# Patient Record
Sex: MALE | Race: WHITE | NOT HISPANIC OR LATINO | Employment: FULL TIME
[De-identification: names, ages, dates, MRNs, and addresses within clinical notes are randomized per-mention and may not be internally consistent; named-entity substitution may affect disease eponyms.]

---

## 2023-02-16 ENCOUNTER — TRANSCRIBE ORDERS (OUTPATIENT)
Dept: OTHER | Age: 38
End: 2023-02-16

## 2023-02-16 DIAGNOSIS — I47.20 VT (VENTRICULAR TACHYCARDIA) (H): Primary | ICD-10-CM

## 2023-02-20 NOTE — TELEPHONE ENCOUNTER
RECORDS RECEIVED FROM: internal /ce    DATE RECEIVED: 2.24.23    NOTES STATUS DETAILS   OFFICE NOTE from referring provider    internal  Jose Daniel Pacheco MD   OFFICE NOTE from other cardiologist        SUMMARY from hospital/ED       MEDICATION LIST   internal     DIAGNOSTIC PROCEDURES     EKG   [facility]- 1.1.23, 9.6.22, 9.5.22, 9.3.22,    Monitor Reports   internal /ce  Internal -(Standing) 2.16.23 Cardiac Device     [place]-- Pacart device- 2.9.23, 2.2.23, 1.20.23, 1.3.23, 10.12.22, 9.22.22,   Pacer- 9.13.22   IMAGING (DISC & REPORT)      Echo   [facility]- 2.3.23, 9.12.22, 9.4.22,        Action 2.20.23 sv    Action Taken Records request sent to  --EKG- 1.1.23, 9.6.22, 9.5.22, 9.3.22,   -- Pacart device- 2.9.23, 2.2.23, 1.20.23, 1.3.23, 10.12.22, 9.22.22,   Pacer- 9.13.22  --ECHO--2.3.23, 9.12.22, 9.4.22,

## 2023-02-24 ENCOUNTER — PRE VISIT (OUTPATIENT)
Dept: CARDIOLOGY | Facility: CLINIC | Age: 38
End: 2023-02-24
Payer: COMMERCIAL

## 2023-02-24 ENCOUNTER — VIRTUAL VISIT (OUTPATIENT)
Dept: CARDIOLOGY | Facility: CLINIC | Age: 38
End: 2023-02-24
Attending: INTERNAL MEDICINE
Payer: COMMERCIAL

## 2023-02-24 DIAGNOSIS — I47.20 VT (VENTRICULAR TACHYCARDIA) (H): ICD-10-CM

## 2023-02-24 PROCEDURE — G0463 HOSPITAL OUTPT CLINIC VISIT: HCPCS | Mod: GT,PN | Performed by: INTERNAL MEDICINE

## 2023-02-24 PROCEDURE — 99205 OFFICE O/P NEW HI 60 MIN: CPT | Mod: GT | Performed by: INTERNAL MEDICINE

## 2023-02-24 RX ORDER — BETAMETHASONE DIPROPIONATE 0.5 MG/G
CREAM TOPICAL
COMMUNITY
Start: 2022-10-09

## 2023-02-24 RX ORDER — LORAZEPAM 0.5 MG/1
1 TABLET ORAL DAILY PRN
Status: ON HOLD | COMMUNITY
Start: 2023-02-18 | End: 2023-04-25

## 2023-02-24 RX ORDER — LIDOCAINE 40 MG/G
CREAM TOPICAL
Status: CANCELLED | OUTPATIENT
Start: 2023-02-24

## 2023-02-24 RX ORDER — METOPROLOL TARTRATE 100 MG
1 TABLET ORAL
COMMUNITY
Start: 2023-01-24

## 2023-02-24 RX ORDER — AMIODARONE HYDROCHLORIDE 200 MG/1
400 TABLET ORAL EVERY MORNING
Status: ON HOLD | COMMUNITY
Start: 2022-09-23 | End: 2023-04-25

## 2023-02-24 RX ORDER — SODIUM CHLORIDE 9 MG/ML
INJECTION, SOLUTION INTRAVENOUS CONTINUOUS
Status: CANCELLED | OUTPATIENT
Start: 2023-02-24

## 2023-02-24 RX ORDER — FOLIC ACID 1 MG/1
1 TABLET ORAL EVERY EVENING
COMMUNITY
Start: 2022-12-31

## 2023-02-24 NOTE — LETTER
2/24/2023      RE: Ryan Morfin  [address] [place] [address]       Dear Colleague,    Thank you for the opportunity to participate in the care of your patient, Ryan Morfin, at the [facility] [place] [facility]. Please see a copy of my visit note below.      Video-Visit Details    Type of service:  Video Visit    Video Start Time (time video started): 9:00 AM    Video End Time (time video stopped): 9:30 AM    Originating Location (pt. Location): Home        Distant Location (provider location):  On-site    Mode of Communication:  Video Conference via [facility]        ELECTROPHYSIOLOGY CLINIC VISIT    Assessment/Recommendations   Assessment/Plan:    Mr Morfin is a 38 yo male patient with PMHx significant for out of hospital cardiac arrest due to VT with no clear etiology otherwise, s/p ICD 9/2022. He is here for VT management.    The patient has had recurrences of mostly monomorphic VT that is fast around 210 bpm.  His work-up for the etiology has been negative.  He has had a negative cardiac angiogram and a cardiac MRI showing no LGE, with only some LV dysfunction on the MRI.  An echocardiogram done afterwards shows a normal ejection fraction.  We will ask for the MRI images to review in our center to make sure if we need to repeat CMR.  We discussed the options for his arrhythmias.  He is young and we would like not to use amiodarone long-term for his arrhythmias.  The alternative would be switching to sotalol.  The other option would be to try an EP study and VT ablation.  The risks of the procedure were explained to the patient.  They include vascular complications, bleeding, pericardial effusion tamponade, CVA or TIA, heart failure.  The success of the procedure is variable depending on inducibility and the situation, and averages about 70%.  The patient prefers trying an ablation over long-term medication.  We will schedule the VT  ablation in the near future and aim to stop amiodarone about a week before that.  We might require a cardiac MRI for the procedure depending on the images from [place].         History of Present Illness/Subjective    Mr. Ryan Morfin is a 37 year old male who comes in today for EP consultation for Vt management..    Mr Morfin is a 36 yo male patient with PMHx significant for out of hospital cardiac arrest due to VT with no clear etiology otherwise, s/p ICD 9/2022. He is here for VT management.    He has history of out of hospital cardiac arrest last fall 9/4/2022. He was at home sitting and watching TV and he had a witnessed cardiac arrest. He had CPR and required defibrillation. He was transferred to [place]. He had a LHC done that showed no significant CAD. ECHO showed EF 55-60%, no wall motion abnormalities. He did undergo cardiac MRI that showed LV EF 41%. During his hospitalization he had further monomorphic VT re quiring 2 shocks prior to ROSC. Telemetry showed monomorphic VT. He did undergo single chamber ICD implant for secondary prevention and was placed on amiodarone.     On December 31st he had an apprpriate ICD shock for monomorphic VT. This was successful. In January he did go to the [place] ED with symptoms of chest pain and chest tightness. He was treated for anxiety. ICD programmed for one VF zone at 200 bpm.    On February 2nd, he was traveling in the vehicle when he started to have symptoms of chest tightness and palpitations, he reports feeling like he was going to have a panic attack. he then received ICD shock. They went to the ED in [place]. He received another appropriate shock for monomorphic VT. Thus his 2 shocks on February 2nd were appropriate. He was kept in [place] and reports that he was loaded with IV amiodarone. His potassium level was 3.6 so he was given IV potassium and IV magnesium. ECHO done showed normal LV function. He has not had any further shocks. He does have at  times chest tightness. He denies lightheaded, dizziness. He has not had genetic testing. He has been on amiodarone 400 every day since September 2022, and had ICD shocks while on 400 daily.  He has no family Hx of VT, father HF but not sure if had MIs, passed away from COVID 2020, estranged with father and dos not know a lot about his family. Maternal GF had MI, passed away long time ago, unsure of reason    I have reviewed and updated the patient's Past Medical History, Social History, Family History and Medication List.     Cardiographics (Personally Reviewed) :   Echo 9/4/2022:      Left Ventricle: The left ventricle appears normal in size. Wall thickness is normal. Normal left ventricular systolic function. The EF is visually estimated to be 55-60%. There are no wall motion abnormalities.      Right Ventricle: The right ventricle appears normal in size.      Left Atrium: The left atrium is normal in size.      Pulmonic Artery: There is no pulmonary hypertension.     Coronary Angiogram 9/6/2022:  Conclusions:   1. No significant CAD.   2. Elevated LV EDP.     CMR 9/8/2022:  1.The left ventricle and right ventricle are normal in dimensions. Left atria and right atria are normal in dimensions. There is no LVH.   2.The left ventricular systolic function is mildly reduced (LV EF is 44%). Mild global hypokinesis is noted. The right ventricular systolic function is normal (RV EF is 45 %).   3.There are no significant valvular abnormalities.   4. Late gadolinium enhanced images are normal. There is no evidence of fibrosis or scar.   5.The pericardium is normal and free of effusions.                  Physical Examination   There were no vitals taken for this visit.  Wt Readings from Last 3 Encounters:   No data found for Wt            Medications  Allergies   No current outpatient medications on file.    Not on File      Lab Results (Personally Reviewed)    Chemistry/lipid CBC Cardiac Enzymes/BNP/TSH/INR   No results  found for: CREATININE, BUN, NA, CO2  No results found for: CR    No results found for: CHOL, HDL, LDL, CHOLHDL   No results found for: WBC, HGB, HCT, MCV, PLT No results found for: CKTOTAL, CKMB, TROPONINI, BNP, TSH, INR       Total time spent on patient visit, reviewing notes, imaging, labs, orders, and completing necessary documentation: 60 minutes.               Please do not hesitate to contact me if you have any questions/concerns.     Sincerely,     Jose Daniel Pacheco MD

## 2023-02-24 NOTE — PROGRESS NOTES
Video-Visit Details    Type of service:  Video Visit    Video Start Time (time video started): 9:00 AM    Video End Time (time video stopped): 9:30 AM    Originating Location (pt. Location): Home        Distant Location (provider location):  On-site    Mode of Communication:  Video Conference via [facility]        ELECTROPHYSIOLOGY CLINIC VISIT    Assessment/Recommendations   Assessment/Plan:    Mr Morfin is a 38 yo male patient with PMHx significant for out of hospital cardiac arrest due to VT with no clear etiology otherwise, s/p ICD 9/2022. He is here for VT management.    The patient has had recurrences of mostly monomorphic VT that is fast around 210 bpm.  His work-up for the etiology has been negative.  He has had a negative cardiac angiogram and a cardiac MRI showing no LGE, with only some LV dysfunction on the MRI.  An echocardiogram done afterwards shows a normal ejection fraction.  We will ask for the MRI images to review in our center to make sure if we need to repeat CMR.  We discussed the options for his arrhythmias.  He is young and we would like not to use amiodarone long-term for his arrhythmias.  The alternative would be switching to sotalol.  The other option would be to try an EP study and VT ablation.  The risks of the procedure were explained to the patient.  They include vascular complications, bleeding, pericardial effusion tamponade, CVA or TIA, heart failure.  The success of the procedure is variable depending on inducibility and the situation, and averages about 70%.  The patient prefers trying an ablation over long-term medication.  We will schedule the VT ablation in the near future and aim to stop amiodarone about a week before that.  We might require a cardiac MRI for the procedure depending on the images from [place].         History of Present Illness/Subjective    Mr. Ryan Morfin is a 37 year old male who comes in today for EP consultation for Vt management..      Navjot is a 36 yo male patient with PMHx significant for out of hospital cardiac arrest due to VT with no clear etiology otherwise, s/p ICD 9/2022. He is here for VT management.    He has history of out of hospital cardiac arrest last fall 9/4/2022. He was at home sitting and watching TV and he had a witnessed cardiac arrest. He had CPR and required defibrillation. He was transferred to [place]. He had a LHC done that showed no significant CAD. ECHO showed EF 55-60%, no wall motion abnormalities. He did undergo cardiac MRI that showed LV EF 41%. During his hospitalization he had further monomorphic VT re quiring 2 shocks prior to ROSC. Telemetry showed monomorphic VT. He did undergo single chamber ICD implant for secondary prevention and was placed on amiodarone.     On December 31st he had an apprpriate ICD shock for monomorphic VT. This was successful. In January he did go to the [place] ED with symptoms of chest pain and chest tightness. He was treated for anxiety. ICD programmed for one VF zone at 200 bpm.    On February 2nd, he was traveling in the vehicle when he started to have symptoms of chest tightness and palpitations, he reports feeling like he was going to have a panic attack. he then received ICD shock. They went to the ED in [place]. He received another appropriate shock for monomorphic VT. Thus his 2 shocks on February 2nd were appropriate. He was kept in [place] and reports that he was loaded with IV amiodarone. His potassium level was 3.6 so he was given IV potassium and IV magnesium. ECHO done showed normal LV function. He has not had any further shocks. He does have at times chest tightness. He denies lightheaded, dizziness. He has not had genetic testing. He has been on amiodarone 400 every day since September 2022, and had ICD shocks while on 400 daily.  He has no family Hx of VT, father HF but not sure if had MIs, passed away from COVID 2020, estranged with father and dos not know a lot about  his family. Maternal GF had MI, passed away long time ago, unsure of reason    I have reviewed and updated the patient's Past Medical History, Social History, Family History and Medication List.     Cardiographics (Personally Reviewed) :   Echo 9/4/2022:      Left Ventricle: The left ventricle appears normal in size. Wall thickness is normal. Normal left ventricular systolic function. The EF is visually estimated to be 55-60%. There are no wall motion abnormalities.      Right Ventricle: The right ventricle appears normal in size.      Left Atrium: The left atrium is normal in size.      Pulmonic Artery: There is no pulmonary hypertension.     Coronary Angiogram 9/6/2022:  Conclusions:   1. No significant CAD.   2. Elevated LV EDP.     CMR 9/8/2022:  1.The left ventricle and right ventricle are normal in dimensions. Left atria and right atria are normal in dimensions. There is no LVH.   2.The left ventricular systolic function is mildly reduced (LV EF is 44%). Mild global hypokinesis is noted. The right ventricular systolic function is normal (RV EF is 45 %).   3.There are no significant valvular abnormalities.   4. Late gadolinium enhanced images are normal. There is no evidence of fibrosis or scar.   5.The pericardium is normal and free of effusions.                  Physical Examination   There were no vitals taken for this visit.  Wt Readings from Last 3 Encounters:   No data found for Wt            Medications  Allergies   No current outpatient medications on file.    Not on File      Lab Results (Personally Reviewed)    Chemistry/lipid CBC Cardiac Enzymes/BNP/TSH/INR   No results found for: CREATININE, BUN, NA, CO2  No results found for: CR    No results found for: CHOL, HDL, LDL, CHOLHDL   No results found for: WBC, HGB, HCT, MCV, PLT No results found for: CKTOTAL, CKMB, TROPONINI, BNP, TSH, INR     Jose Daniel Pacheco MD [facility]  Cardiology - Electrophysiology    Total time spent on patient visit, reviewing  notes, imaging, labs, orders, and completing necessary documentation: 60 minutes.

## 2023-02-24 NOTE — NURSING NOTE
Chief Complaint   Patient presents with     Consult     No other pt reported vitals to report today, states taken in clinic a few weeks ago     Is the patient currently in the state of [place]? NO    Visit mode:VIDEO    If the visit is dropped, the patient can be reconnected by: VIDEO VISIT: Text to cell phone: [phone]    Will anyone else be joining the visit? NO      How would you like to obtain your AVS? MyChart    Are changes needed to the allergy or medication list? NO    Sloan Vital, ANTONIO/CMA

## 2023-02-24 NOTE — LETTER
February 24, 2023        TO:   Ryan Morfin  [address] [place] [address]       Dear Ryan,    You are scheduled for a VT ablation, at [facility], [place], with Dr. Pacheco. The hospital is located at [address] [place] [address] on the [place] bank of the campus.  If you need to cancel this procedure, please call [phone]. Please note the following schedule below:    Pre-Admission Phone Call will occur 1-2 days prior to procedure date.You do not need to come to the [facility].    Visitor Policy: Two visitors.      Date:_April 24, 2023_____  Time: ___6a____To the [address] at the [facility]  VT Ablation    1. Please review the attached instructions on showering before your procedure at the end of this letter.  2. Your history and physical will be completed by our nurse practitioner when you arrive.  3. Please do not eat anything for 8 hours prior to your procedure. You may have sips of water up until 2 hours prior to your arrival.  4. The morning of your procedure, you may take your scheduled medications with a sip of water.  HOLD:  - Amiodarone 1 week prior  5. You will likely discharge the same day and need a .        Post-Procedure Instructions  Care of groin site:    Remove the Band-Aid after 24 hours. If there is minor oozing, apply another Band-aid and remove it after 12 hours.     Do NOT take a bath, use a hot tub, pool, or submerse in water for at least 3 days. You may shower.     It is normal to have a small bruise or lump at the site.    Do not scrub the site.    Do not use lotion or powder near the puncture site for 3 days.    If you start bleeding from the site in your groin: Lie down flat and press firmly on the site. Call your physician immediately, or, come to the emergency room.  Call 911 right away if you have bleeding that is heavy or does not stop.    Call your doctor/provider if:     You have a large or growing hard lump around the site.     The site is red,  "swollen, hot or tender.     You have chills or a fever greater than 101 F (38 C).     Blood or fluid is draining from the site.     Your leg or arm turns bluish, feels numb or cool.     You have hives, a rash or unusual itching.    Activity Restrictions    For the first 2 days: Do not stoop or squat. When you cough, sneeze or move your bowels, hold your hand over the puncture site and press gently.    Do not lift more than 10 pounds or exertional activity for 10 days.  - No driving for 24 hours after (with or without general anesthesia).       Date: __July 26, 2023  8a video visit  Follow up appointment with local ICD check done prior (please have the device team fax to [phone] \"Attention Dr. Pacheco/Sydnee\"      Please do not hesitate to utilize MyChart or call us if you have any questions or concerns.    Sydnee Kline RN  Electrophysiology Nurse Coordinator  [phone]    [facility] Procedure   [phone]            Showering Before Surgery   Your surgeon has asked you to take 2 showers before surgery.  Why is this important?  It is normal for bacteria (germs) to be on your skin. The skin protects us from these germs. When you have surgery, we cut the skin. Sometimes germs get into the cuts and cause infection (illness caused by germs). By following the instructions below and using special soap, you will lower the number of germs on your skin. This decreases your chance of infection.  Special soap  Buy or get 8 ounces of antiseptic surgical soap called 4% CHG. Common name brands of this soap are Hibiclens and Exidine.   You can find it at your local pharmacy, clinic or retail store. If you have trouble, ask your pharmacist to help you find the right substitute.   A note about shaving:  Do not shave within 12 inches of your incision (surgical cut) area for at least 3 days before surgery. Shaving can make small cuts in the skin. This puts you at a higher risk of infection.  Items you will need " for each shower:    1 newly washed towel    4 ounces of one of the above soaps  Follow these instructions:  The evening before surgery   1. Wash your hair and body with your regular shampoo and soap. Make sure you rinse the shampoo and soap from your hair and body.   2. Using clean hands, apply about 2 ounces of soap gently on your skin from the neck to your toes. Use on your groin area last. Do not use this soap on your face or head. If you get any soap in your eyes, ears or mouth, rinse right away.   3. Repeat step 2. It is very important to let the soap stay on your skin for at least 1 minute.   4. Rinse well and dry off using a clean towel.If you feel any tingling, itching or other irritation, rinse right away. It is normal to feel some coolness on the skin after using the antiseptic soap. Your skin may feel a bit dry after the shower, but do not use any lotions, creams or moisturizers. Do not use hair spray or other products in your hair.  5. Dress in freshly washed clothes or pajamas. Use fresh pillowcases and sheets on your bed.    The morning of surgery  1. Wash your hair and body with your regular shampoo and soap. Make sure you rinse the shampoo and soap from your hair and body.   2. Using clean hands, apply about 2 ounces of soap gently on your skin from the neck to your toes. Use on your groin area last. Do not use this soap on your face or head. If you get any soap in your eyes, ears or mouth, rinse right away.   3. Repeat step 2. It is very important to let the soap stay on your skin for at least 1 minute.   4. Rinse well and dry off using a clean towel.If you feel any tingling, itching or other irritation, rinse right away. It is normal to feel some coolness on the skin after using the antiseptic soap. Your skin may feel a bit dry after the shower, but do not use any lotions, creams or moisturizers. Do not use hair spray or other products in your hair.  5. Dress in clean clothes.  If you have any  questions about showering or an allergy to CHG soap, please call the [facility] Nursing Department at the hospital where you are having your surgery.  [facility]: [phone]  [facility]: [phone]  [place]: [phone] or [phone]  [facility]: [phone].  [facility]: [phone]  [place]: [phone]  [facility]): [phone]  [facility] ([facility]): [phone]  This phone number will be answered between the hours of 8:00 a.m. and 6:30 p.m. Monday through Friday.

## 2023-03-13 ENCOUNTER — TELEPHONE (OUTPATIENT)
Dept: CARDIOLOGY | Facility: CLINIC | Age: 38
End: 2023-03-13

## 2023-03-13 NOTE — TELEPHONE ENCOUNTER
Called and spoke with patient in regards to scheduling, he already saw a genetic counselor through [facility] and is receiving his kit tomorrow.

## 2023-04-13 ENCOUNTER — PATIENT OUTREACH (OUTPATIENT)
Dept: CARDIOLOGY | Facility: CLINIC | Age: 38
End: 2023-04-13
Payer: COMMERCIAL

## 2023-04-13 NOTE — PROGRESS NOTES
Called and left message for patient to review pre-procedure instructions for upcoming VT ablation on 4/24/23, specifically reviewed medication holds. Asked for a call back/MyC message with any questions.

## 2023-04-21 ENCOUNTER — ANESTHESIA EVENT (OUTPATIENT)
Dept: CARDIOLOGY | Facility: CLINIC | Age: 38
End: 2023-04-21
Payer: COMMERCIAL

## 2023-04-24 ENCOUNTER — ANCILLARY PROCEDURE (OUTPATIENT)
Dept: CARDIOLOGY | Facility: CLINIC | Age: 38
End: 2023-04-24
Attending: INTERNAL MEDICINE
Payer: COMMERCIAL

## 2023-04-24 ENCOUNTER — ANESTHESIA (OUTPATIENT)
Dept: CARDIOLOGY | Facility: CLINIC | Age: 38
End: 2023-04-24
Payer: COMMERCIAL

## 2023-04-24 ENCOUNTER — HOSPITAL ENCOUNTER (OUTPATIENT)
Facility: CLINIC | Age: 38
Discharge: HOME | End: 2023-04-25
Attending: INTERNAL MEDICINE | Admitting: INTERNAL MEDICINE
Payer: COMMERCIAL

## 2023-04-24 DIAGNOSIS — I47.20 VT (VENTRICULAR TACHYCARDIA) (H): Primary | ICD-10-CM

## 2023-04-24 DIAGNOSIS — I47.20 VT (VENTRICULAR TACHYCARDIA) (H): ICD-10-CM

## 2023-04-24 LAB
ABO/RH(D): NORMAL
ACT BLD: 147 SECONDS (ref 74–150)
ACT BLD: 190 SECONDS (ref 74–150)
ACT BLD: 284 SECONDS (ref 74–150)
ACT BLD: 318 SECONDS (ref 74–150)
ANION GAP SERPL CALCULATED.3IONS-SCNC: 13 MMOL/L (ref 7–15)
ANTIBODY SCREEN: NEGATIVE
ATRIAL RATE - MUSE: 58 BPM
BUN SERPL-MCNC: 18.1 MG/DL (ref 6–20)
CALCIUM SERPL-MCNC: 8.7 MG/DL (ref 8.6–10)
CHLORIDE SERPL-SCNC: 108 MMOL/L (ref 98–107)
CREAT SERPL-MCNC: 0.93 MG/DL (ref 0.67–1.17)
DEPRECATED HCO3 PLAS-SCNC: 18 MMOL/L (ref 22–29)
DIASTOLIC BLOOD PRESSURE - MUSE: NORMAL MMHG
ERYTHROCYTE [DISTWIDTH] IN BLOOD BY AUTOMATED COUNT: 12.3 % (ref 10–15)
GFR SERPL CREATININE-BSD FRML MDRD: >90 ML/MIN/1.73M2
GLUCOSE BLDC GLUCOMTR-MCNC: 137 MG/DL (ref 70–99)
GLUCOSE SERPL-MCNC: 118 MG/DL (ref 70–99)
HCT VFR BLD AUTO: 44.8 % (ref 40–53)
HGB BLD-MCNC: 14.9 G/DL (ref 13.3–17.7)
INTERPRETATION ECG - MUSE: NORMAL
MCH RBC QN AUTO: 31.3 PG (ref 26.5–33)
MCHC RBC AUTO-ENTMCNC: 33.3 G/DL (ref 31.5–36.5)
MCV RBC AUTO: 94 FL (ref 78–100)
P AXIS - MUSE: 22 DEGREES
PLATELET # BLD AUTO: 193 10E3/UL (ref 150–450)
POTASSIUM SERPL-SCNC: 4.3 MMOL/L (ref 3.4–5.3)
PR INTERVAL - MUSE: 136 MS
QRS DURATION - MUSE: 102 MS
QT - MUSE: 446 MS
QTC - MUSE: 437 MS
R AXIS - MUSE: 4 DEGREES
RBC # BLD AUTO: 4.76 10E6/UL (ref 4.4–5.9)
SODIUM SERPL-SCNC: 139 MMOL/L (ref 136–145)
SPECIMEN EXPIRATION DATE: NORMAL
SYSTOLIC BLOOD PRESSURE - MUSE: NORMAL MMHG
T AXIS - MUSE: 10 DEGREES
VENTRICULAR RATE- MUSE: 58 BPM
WBC # BLD AUTO: 6.9 10E3/UL (ref 4–11)

## 2023-04-24 PROCEDURE — C1894 INTRO/SHEATH, NON-LASER: HCPCS | Performed by: INTERNAL MEDICINE

## 2023-04-24 PROCEDURE — 93462 L HRT CATH TRNSPTL PUNCTURE: CPT | Mod: GC | Performed by: INTERNAL MEDICINE

## 2023-04-24 PROCEDURE — C1733 CATH, EP, OTHR THAN COOL-TIP: HCPCS | Performed by: INTERNAL MEDICINE

## 2023-04-24 PROCEDURE — 99207 CARDIAC DEVICE CHECK - INPATIENT: CPT | Mod: 26 | Performed by: INTERNAL MEDICINE

## 2023-04-24 PROCEDURE — 258N000003 HC RX IP 258 OP 636: Performed by: INTERNAL MEDICINE

## 2023-04-24 PROCEDURE — 93005 ELECTROCARDIOGRAM TRACING: CPT

## 2023-04-24 PROCEDURE — 86901 BLOOD TYPING SEROLOGIC RH(D): CPT | Performed by: INTERNAL MEDICINE

## 2023-04-24 PROCEDURE — 93462 L HRT CATH TRNSPTL PUNCTURE: CPT | Performed by: INTERNAL MEDICINE

## 2023-04-24 PROCEDURE — 85027 COMPLETE CBC AUTOMATED: CPT | Performed by: INTERNAL MEDICINE

## 2023-04-24 PROCEDURE — 250N000009 HC RX 250: Performed by: NURSE ANESTHETIST, CERTIFIED REGISTERED

## 2023-04-24 PROCEDURE — 272N000001 HC OR GENERAL SUPPLY STERILE: Performed by: INTERNAL MEDICINE

## 2023-04-24 PROCEDURE — 82962 GLUCOSE BLOOD TEST: CPT

## 2023-04-24 PROCEDURE — 93623 PRGRMD STIMJ&PACG IV RX NFS: CPT | Mod: 26 | Performed by: INTERNAL MEDICINE

## 2023-04-24 PROCEDURE — 250N000011 HC RX IP 250 OP 636: Performed by: INTERNAL MEDICINE

## 2023-04-24 PROCEDURE — 82310 ASSAY OF CALCIUM: CPT | Performed by: INTERNAL MEDICINE

## 2023-04-24 PROCEDURE — C1730 CATH, EP, 19 OR FEW ELECT: HCPCS | Performed by: INTERNAL MEDICINE

## 2023-04-24 PROCEDURE — 370N000017 HC ANESTHESIA TECHNICAL FEE, PER MIN: Performed by: INTERNAL MEDICINE

## 2023-04-24 PROCEDURE — 999N000064 CARDIAC DEVICE CHECK - INPATIENT

## 2023-04-24 PROCEDURE — 85347 COAGULATION TIME ACTIVATED: CPT

## 2023-04-24 PROCEDURE — 250N000011 HC RX IP 250 OP 636: Performed by: NURSE ANESTHETIST, CERTIFIED REGISTERED

## 2023-04-24 PROCEDURE — 258N000003 HC RX IP 258 OP 636: Performed by: STUDENT IN AN ORGANIZED HEALTH CARE EDUCATION/TRAINING PROGRAM

## 2023-04-24 PROCEDURE — 93287 PERI-PX DEVICE EVAL & PRGR: CPT | Mod: XU

## 2023-04-24 PROCEDURE — 93623 PRGRMD STIMJ&PACG IV RX NFS: CPT | Performed by: INTERNAL MEDICINE

## 2023-04-24 PROCEDURE — C1887 CATHETER, GUIDING: HCPCS | Performed by: INTERNAL MEDICINE

## 2023-04-24 PROCEDURE — 93662 INTRACARDIAC ECG (ICE): CPT | Performed by: INTERNAL MEDICINE

## 2023-04-24 PROCEDURE — 250N000013 HC RX MED GY IP 250 OP 250 PS 637: Performed by: NURSE PRACTITIONER

## 2023-04-24 PROCEDURE — 93010 ELECTROCARDIOGRAM REPORT: CPT | Mod: XU | Performed by: INTERNAL MEDICINE

## 2023-04-24 PROCEDURE — 93654 COMPRE EP EVAL TX VT: CPT | Performed by: INTERNAL MEDICINE

## 2023-04-24 PROCEDURE — 999N000054 HC STATISTIC EKG NON-CHARGEABLE

## 2023-04-24 PROCEDURE — 258N000003 HC RX IP 258 OP 636: Performed by: NURSE ANESTHETIST, CERTIFIED REGISTERED

## 2023-04-24 PROCEDURE — C1732 CATH, EP, DIAG/ABL, 3D/VECT: HCPCS | Performed by: INTERNAL MEDICINE

## 2023-04-24 PROCEDURE — 93662 INTRACARDIAC ECG (ICE): CPT | Mod: 26 | Performed by: INTERNAL MEDICINE

## 2023-04-24 PROCEDURE — C1759 CATH, INTRA ECHOCARDIOGRAPHY: HCPCS | Performed by: INTERNAL MEDICINE

## 2023-04-24 PROCEDURE — 93654 COMPRE EP EVAL TX VT: CPT | Mod: GC | Performed by: INTERNAL MEDICINE

## 2023-04-24 PROCEDURE — 36415 COLL VENOUS BLD VENIPUNCTURE: CPT | Performed by: INTERNAL MEDICINE

## 2023-04-24 PROCEDURE — 250N000009 HC RX 250: Performed by: INTERNAL MEDICINE

## 2023-04-24 PROCEDURE — C1769 GUIDE WIRE: HCPCS | Performed by: INTERNAL MEDICINE

## 2023-04-24 RX ORDER — FENTANYL CITRATE 50 UG/ML
25 INJECTION, SOLUTION INTRAMUSCULAR; INTRAVENOUS EVERY 5 MIN PRN
Status: DISCONTINUED | OUTPATIENT
Start: 2023-04-24 | End: 2023-04-24 | Stop reason: HOSPADM

## 2023-04-24 RX ORDER — LIDOCAINE 40 MG/G
CREAM TOPICAL
Status: DISCONTINUED | OUTPATIENT
Start: 2023-04-24 | End: 2023-04-24 | Stop reason: HOSPADM

## 2023-04-24 RX ORDER — FENTANYL CITRATE 50 UG/ML
INJECTION, SOLUTION INTRAMUSCULAR; INTRAVENOUS PRN
Status: DISCONTINUED | OUTPATIENT
Start: 2023-04-24 | End: 2023-04-24

## 2023-04-24 RX ORDER — NALOXONE HYDROCHLORIDE 0.4 MG/ML
0.4 INJECTION, SOLUTION INTRAMUSCULAR; INTRAVENOUS; SUBCUTANEOUS
Status: DISCONTINUED | OUTPATIENT
Start: 2023-04-24 | End: 2023-04-25 | Stop reason: HOSPADM

## 2023-04-24 RX ORDER — HYDROMORPHONE HCL IN WATER/PF 6 MG/30 ML
0.4 PATIENT CONTROLLED ANALGESIA SYRINGE INTRAVENOUS EVERY 5 MIN PRN
Status: DISCONTINUED | OUTPATIENT
Start: 2023-04-24 | End: 2023-04-24 | Stop reason: HOSPADM

## 2023-04-24 RX ORDER — ONDANSETRON 2 MG/ML
4 INJECTION INTRAMUSCULAR; INTRAVENOUS EVERY 30 MIN PRN
Status: DISCONTINUED | OUTPATIENT
Start: 2023-04-24 | End: 2023-04-24 | Stop reason: HOSPADM

## 2023-04-24 RX ORDER — SODIUM CHLORIDE, SODIUM LACTATE, POTASSIUM CHLORIDE, CALCIUM CHLORIDE 600; 310; 30; 20 MG/100ML; MG/100ML; MG/100ML; MG/100ML
INJECTION, SOLUTION INTRAVENOUS CONTINUOUS
Status: DISCONTINUED | OUTPATIENT
Start: 2023-04-24 | End: 2023-04-24 | Stop reason: HOSPADM

## 2023-04-24 RX ORDER — ONDANSETRON 4 MG/1
4 TABLET, ORALLY DISINTEGRATING ORAL EVERY 30 MIN PRN
Status: DISCONTINUED | OUTPATIENT
Start: 2023-04-24 | End: 2023-04-24 | Stop reason: HOSPADM

## 2023-04-24 RX ORDER — CEFAZOLIN SODIUM 1 G/3ML
INJECTION, POWDER, FOR SOLUTION INTRAMUSCULAR; INTRAVENOUS PRN
Status: DISCONTINUED | OUTPATIENT
Start: 2023-04-24 | End: 2023-04-24

## 2023-04-24 RX ORDER — HEPARIN SODIUM 1000 [USP'U]/ML
INJECTION, SOLUTION INTRAVENOUS; SUBCUTANEOUS PRN
Status: DISCONTINUED | OUTPATIENT
Start: 2023-04-24 | End: 2023-04-24

## 2023-04-24 RX ORDER — HYDROMORPHONE HCL IN WATER/PF 6 MG/30 ML
0.2 PATIENT CONTROLLED ANALGESIA SYRINGE INTRAVENOUS EVERY 5 MIN PRN
Status: DISCONTINUED | OUTPATIENT
Start: 2023-04-24 | End: 2023-04-24 | Stop reason: HOSPADM

## 2023-04-24 RX ORDER — NALOXONE HYDROCHLORIDE 0.4 MG/ML
0.2 INJECTION, SOLUTION INTRAMUSCULAR; INTRAVENOUS; SUBCUTANEOUS
Status: DISCONTINUED | OUTPATIENT
Start: 2023-04-24 | End: 2023-04-25 | Stop reason: HOSPADM

## 2023-04-24 RX ORDER — GUSELKUMAB 100 MG/ML
100 INJECTION SUBCUTANEOUS
COMMUNITY
Start: 2023-04-20

## 2023-04-24 RX ORDER — LORAZEPAM 0.5 MG/1
0.5 TABLET ORAL DAILY PRN
Status: DISCONTINUED | OUTPATIENT
Start: 2023-04-24 | End: 2023-04-25 | Stop reason: HOSPADM

## 2023-04-24 RX ORDER — CALCIUM CHLORIDE 100 MG/ML
INJECTION INTRAVENOUS; INTRAVENTRICULAR PRN
Status: DISCONTINUED | OUTPATIENT
Start: 2023-04-24 | End: 2023-04-24

## 2023-04-24 RX ORDER — DOPAMINE HYDROCHLORIDE 160 MG/100ML
INJECTION, SOLUTION INTRAVENOUS CONTINUOUS PRN
Status: DISCONTINUED | OUTPATIENT
Start: 2023-04-24 | End: 2023-04-24

## 2023-04-24 RX ORDER — NALOXONE HYDROCHLORIDE 0.4 MG/ML
0.4 INJECTION, SOLUTION INTRAMUSCULAR; INTRAVENOUS; SUBCUTANEOUS
Status: DISCONTINUED | OUTPATIENT
Start: 2023-04-24 | End: 2023-04-25

## 2023-04-24 RX ORDER — PROTAMINE SULFATE 10 MG/ML
INJECTION, SOLUTION INTRAVENOUS PRN
Status: DISCONTINUED | OUTPATIENT
Start: 2023-04-24 | End: 2023-04-24

## 2023-04-24 RX ORDER — NALOXONE HYDROCHLORIDE 0.4 MG/ML
0.2 INJECTION, SOLUTION INTRAMUSCULAR; INTRAVENOUS; SUBCUTANEOUS
Status: DISCONTINUED | OUTPATIENT
Start: 2023-04-24 | End: 2023-04-25

## 2023-04-24 RX ORDER — SODIUM CHLORIDE, SODIUM LACTATE, POTASSIUM CHLORIDE, CALCIUM CHLORIDE 600; 310; 30; 20 MG/100ML; MG/100ML; MG/100ML; MG/100ML
INJECTION, SOLUTION INTRAVENOUS CONTINUOUS PRN
Status: DISCONTINUED | OUTPATIENT
Start: 2023-04-24 | End: 2023-04-24

## 2023-04-24 RX ORDER — FENTANYL CITRATE 50 UG/ML
50 INJECTION, SOLUTION INTRAMUSCULAR; INTRAVENOUS EVERY 5 MIN PRN
Status: DISCONTINUED | OUTPATIENT
Start: 2023-04-24 | End: 2023-04-24 | Stop reason: HOSPADM

## 2023-04-24 RX ORDER — SODIUM CHLORIDE 9 MG/ML
INJECTION, SOLUTION INTRAVENOUS CONTINUOUS
Status: DISCONTINUED | OUTPATIENT
Start: 2023-04-24 | End: 2023-04-24 | Stop reason: HOSPADM

## 2023-04-24 RX ORDER — OXYCODONE AND ACETAMINOPHEN 5; 325 MG/1; MG/1
1 TABLET ORAL EVERY 4 HOURS PRN
Status: DISCONTINUED | OUTPATIENT
Start: 2023-04-24 | End: 2023-04-25 | Stop reason: HOSPADM

## 2023-04-24 RX ORDER — HALOPERIDOL 5 MG/ML
1 INJECTION INTRAMUSCULAR
Status: DISCONTINUED | OUTPATIENT
Start: 2023-04-24 | End: 2023-04-24 | Stop reason: HOSPADM

## 2023-04-24 RX ORDER — PROPOFOL 10 MG/ML
INJECTION, EMULSION INTRAVENOUS PRN
Status: DISCONTINUED | OUTPATIENT
Start: 2023-04-24 | End: 2023-04-24

## 2023-04-24 RX ORDER — AMIODARONE HYDROCHLORIDE 200 MG/1
200 TABLET ORAL DAILY
Status: DISCONTINUED | OUTPATIENT
Start: 2023-04-25 | End: 2023-04-25

## 2023-04-24 RX ORDER — METOPROLOL TARTRATE 100 MG
100 TABLET ORAL
Status: DISCONTINUED | OUTPATIENT
Start: 2023-04-25 | End: 2023-04-25 | Stop reason: HOSPADM

## 2023-04-24 RX ORDER — IOPAMIDOL 755 MG/ML
INJECTION, SOLUTION INTRAVASCULAR
Status: DISCONTINUED | OUTPATIENT
Start: 2023-04-24 | End: 2023-04-24 | Stop reason: HOSPADM

## 2023-04-24 RX ORDER — LIDOCAINE HYDROCHLORIDE 20 MG/ML
INJECTION, SOLUTION INFILTRATION; PERINEURAL PRN
Status: DISCONTINUED | OUTPATIENT
Start: 2023-04-24 | End: 2023-04-24

## 2023-04-24 RX ADMIN — NOREPINEPHRINE BITARTRATE 6.4 MCG: 1 INJECTION, SOLUTION, CONCENTRATE INTRAVENOUS at 13:26

## 2023-04-24 RX ADMIN — FENTANYL CITRATE 100 MCG: 50 INJECTION, SOLUTION INTRAMUSCULAR; INTRAVENOUS at 08:35

## 2023-04-24 RX ADMIN — HEPARIN SODIUM 13000 UNITS: 1000 INJECTION INTRAVENOUS; SUBCUTANEOUS at 11:05

## 2023-04-24 RX ADMIN — NOREPINEPHRINE BITARTRATE 6.4 MCG: 1 INJECTION, SOLUTION, CONCENTRATE INTRAVENOUS at 15:42

## 2023-04-24 RX ADMIN — CEFAZOLIN 1 G: 1 INJECTION, POWDER, FOR SOLUTION INTRAMUSCULAR; INTRAVENOUS at 13:00

## 2023-04-24 RX ADMIN — CEFAZOLIN 3 G: 1 INJECTION, POWDER, FOR SOLUTION INTRAMUSCULAR; INTRAVENOUS at 08:55

## 2023-04-24 RX ADMIN — CALCIUM CHLORIDE 500 MG: 100 INJECTION, SOLUTION INTRAVENOUS at 10:28

## 2023-04-24 RX ADMIN — PROPOFOL 200 MG: 10 INJECTION, EMULSION INTRAVENOUS at 08:43

## 2023-04-24 RX ADMIN — PROPOFOL 200 MG: 10 INJECTION, EMULSION INTRAVENOUS at 08:35

## 2023-04-24 RX ADMIN — NOREPINEPHRINE BITARTRATE 6.4 MCG: 1 INJECTION, SOLUTION, CONCENTRATE INTRAVENOUS at 15:44

## 2023-04-24 RX ADMIN — SODIUM CHLORIDE, POTASSIUM CHLORIDE, SODIUM LACTATE AND CALCIUM CHLORIDE: 600; 310; 30; 20 INJECTION, SOLUTION INTRAVENOUS at 08:18

## 2023-04-24 RX ADMIN — NOREPINEPHRINE BITARTRATE 6.4 MCG: 1 INJECTION, SOLUTION, CONCENTRATE INTRAVENOUS at 09:24

## 2023-04-24 RX ADMIN — HEPARIN SODIUM 5000 UNITS: 1000 INJECTION INTRAVENOUS; SUBCUTANEOUS at 11:23

## 2023-04-24 RX ADMIN — SODIUM CHLORIDE: 9 INJECTION, SOLUTION INTRAVENOUS at 14:20

## 2023-04-24 RX ADMIN — PHENYLEPHRINE HYDROCHLORIDE 200 MCG: 10 INJECTION INTRAVENOUS at 08:57

## 2023-04-24 RX ADMIN — NOREPINEPHRINE BITARTRATE 6.4 MCG: 1 INJECTION, SOLUTION, CONCENTRATE INTRAVENOUS at 09:12

## 2023-04-24 RX ADMIN — CALCIUM CHLORIDE 500 MG: 100 INJECTION, SOLUTION INTRAVENOUS at 10:19

## 2023-04-24 RX ADMIN — ROCURONIUM BROMIDE 20 MG: 50 INJECTION, SOLUTION INTRAVENOUS at 12:32

## 2023-04-24 RX ADMIN — ROCURONIUM BROMIDE 20 MG: 50 INJECTION, SOLUTION INTRAVENOUS at 10:24

## 2023-04-24 RX ADMIN — NOREPINEPHRINE BITARTRATE 6.4 MCG: 1 INJECTION, SOLUTION, CONCENTRATE INTRAVENOUS at 11:09

## 2023-04-24 RX ADMIN — NOREPINEPHRINE BITARTRATE 6.4 MCG: 1 INJECTION, SOLUTION, CONCENTRATE INTRAVENOUS at 11:31

## 2023-04-24 RX ADMIN — PROTAMINE SULFATE 50 MG: 10 INJECTION, SOLUTION INTRAVENOUS at 11:51

## 2023-04-24 RX ADMIN — NOREPINEPHRINE BITARTRATE 6.4 MCG: 1 INJECTION, SOLUTION, CONCENTRATE INTRAVENOUS at 11:08

## 2023-04-24 RX ADMIN — NOREPINEPHRINE BITARTRATE 6.4 MCG: 1 INJECTION, SOLUTION, CONCENTRATE INTRAVENOUS at 14:59

## 2023-04-24 RX ADMIN — ROCURONIUM BROMIDE 30 MG: 50 INJECTION, SOLUTION INTRAVENOUS at 13:09

## 2023-04-24 RX ADMIN — PHENYLEPHRINE HYDROCHLORIDE 200 MCG: 10 INJECTION INTRAVENOUS at 09:05

## 2023-04-24 RX ADMIN — MIDAZOLAM 2 MG: 1 INJECTION INTRAMUSCULAR; INTRAVENOUS at 08:22

## 2023-04-24 RX ADMIN — CALCIUM CHLORIDE 500 MG: 100 INJECTION, SOLUTION INTRAVENOUS at 15:11

## 2023-04-24 RX ADMIN — SODIUM CHLORIDE, POTASSIUM CHLORIDE, SODIUM LACTATE AND CALCIUM CHLORIDE: 600; 310; 30; 20 INJECTION, SOLUTION INTRAVENOUS at 09:30

## 2023-04-24 RX ADMIN — ROCURONIUM BROMIDE 100 MG: 50 INJECTION, SOLUTION INTRAVENOUS at 08:43

## 2023-04-24 RX ADMIN — PHENYLEPHRINE HYDROCHLORIDE 200 MCG: 10 INJECTION INTRAVENOUS at 08:43

## 2023-04-24 RX ADMIN — ROCURONIUM BROMIDE 30 MG: 50 INJECTION, SOLUTION INTRAVENOUS at 10:49

## 2023-04-24 RX ADMIN — NOREPINEPHRINE BITARTRATE 6.4 MCG: 1 INJECTION, SOLUTION, CONCENTRATE INTRAVENOUS at 09:48

## 2023-04-24 RX ADMIN — FENTANYL CITRATE 100 MCG: 50 INJECTION, SOLUTION INTRAMUSCULAR; INTRAVENOUS at 08:43

## 2023-04-24 RX ADMIN — HYDROMORPHONE HYDROCHLORIDE 0.5 MG: 1 INJECTION, SOLUTION INTRAMUSCULAR; INTRAVENOUS; SUBCUTANEOUS at 15:34

## 2023-04-24 RX ADMIN — PHENYLEPHRINE HYDROCHLORIDE 100 MCG: 10 INJECTION INTRAVENOUS at 14:57

## 2023-04-24 RX ADMIN — NOREPINEPHRINE BITARTRATE 12.8 MCG: 1 INJECTION, SOLUTION, CONCENTRATE INTRAVENOUS at 15:19

## 2023-04-24 RX ADMIN — NOREPINEPHRINE BITARTRATE 6.4 MCG: 1 INJECTION, SOLUTION, CONCENTRATE INTRAVENOUS at 11:17

## 2023-04-24 RX ADMIN — NOREPINEPHRINE BITARTRATE 6.4 MCG: 1 INJECTION, SOLUTION, CONCENTRATE INTRAVENOUS at 13:31

## 2023-04-24 RX ADMIN — DOPAMINE HYDROCHLORIDE 3 MCG/KG/MIN: 160 INJECTION, SOLUTION INTRAVENOUS at 09:41

## 2023-04-24 RX ADMIN — OXYCODONE HYDROCHLORIDE AND ACETAMINOPHEN 1 TABLET: 5; 325 TABLET ORAL at 18:07

## 2023-04-24 RX ADMIN — SUGAMMADEX 200 MG: 100 INJECTION, SOLUTION INTRAVENOUS at 15:41

## 2023-04-24 ASSESSMENT — ACTIVITIES OF DAILY LIVING (ADL)
ADLS_ACUITY_SCORE: 35
ADLS_ACUITY_SCORE: 31
ADLS_ACUITY_SCORE: 35

## 2023-04-24 ASSESSMENT — ENCOUNTER SYMPTOMS: SEIZURES: 1

## 2023-04-24 NOTE — ANESTHESIA POSTPROCEDURE EVALUATION
Patient: Ryan Morfin    Procedure: Procedure(s):  EP Ablation VT       Anesthesia Type:  General    Note:  Disposition: Outpatient   Postop Pain Control: Uneventful            Sign Out: Well controlled pain   PONV: No   Neuro/Psych: Uneventful            Sign Out: Acceptable/Baseline neuro status   Airway/Respiratory: Uneventful            Sign Out: Acceptable/Baseline resp. status   CV/Hemodynamics: Uneventful            Sign Out: Acceptable CV status; No obvious hypovolemia; No obvious fluid overload   Other NRE: NONE   DID A NON-ROUTINE EVENT OCCUR? No           Last vitals:  Vitals Value Taken Time   /65 04/24/23 1645   Temp 36.7  C (98  F) 04/24/23 1630   Pulse 66 04/24/23 1654   Resp 23 04/24/23 1654   SpO2 91 % 04/24/23 1654   Vitals shown include unvalidated device data.    Electronically Signed By: Susannah Farley MD  April 24, 2023  4:56 PM

## 2023-04-24 NOTE — Clinical Note
Potential access sites were evaluated for patency using ultrasound.   The right femoral vein were selected. Access was obtained under with Sonosite and Fluoroscopic guidance using a micropuncture 21 gauge needle with direct visualization of needle entry.

## 2023-04-24 NOTE — ANESTHESIA CARE TRANSFER NOTE
Patient: Ryan Morfin    Procedure: Procedure(s):  EP Ablation VT       Diagnosis: ventricular tachycardia  Diagnosis Additional Information: No value filed.    Anesthesia Type:   General     Note:    Oropharynx: oropharynx clear of all foreign objects and spontaneously breathing  Level of Consciousness: awake  Oxygen Supplementation: nasal cannula  Level of Supplemental Oxygen (L/min / FiO2): 3  Independent Airway: airway patency satisfactory and stable  Dentition: dentition unchanged  Vital Signs Stable: post-procedure vital signs reviewed and stable  Report to RN Given: handoff report given  Patient transferred to: PACU    Handoff Report: Identifed the Patient, Identified the Reponsible Provider, Reviewed the pertinent medical history, Discussed the surgical course, Reviewed Intra-OP anesthesia mangement and issues during anesthesia, Set expectations for post-procedure period and Allowed opportunity for questions and acknowledgement of understanding      Vitals:  Vitals Value Taken Time   /76 04/24/23 1608   Temp     Pulse 70 04/24/23 1611   Resp 18 04/24/23 1611   SpO2 95 % 04/24/23 1611   Vitals shown include unvalidated device data.    Electronically Signed By: TALHA Contreras CRNA  April 24, 2023  4:12 PM

## 2023-04-24 NOTE — ANESTHESIA PROCEDURE NOTES
Airway       Patient location during procedure: OR       Procedure Start/Stop Times: 4/24/2023 8:48 AM  Staff -        CRNA: Uzma Garcia APRN CRNA       Performed By: CRNA  Consent for Airway        Urgency: elective  Indications and Patient Condition       Indications for airway management: arpit-procedural       Induction type:intravenous       Mask difficulty assessment: 2 - vent by mask + OA or adjuvant +/- NMBA    Final Airway Details       Final airway type: endotracheal airway       Successful airway: ETT - single  Endotracheal Airway Details        ETT size (mm): 8.0       Cuffed: yes       Successful intubation technique: video laryngoscopy       VL Blade Size: MAC D Blade       Grade View of Cords: 1 (cords open/clear)       Adjucts: stylet       Position: Right       Measured from: lips       Secured at (cm): 24       Bite block used: None    Post intubation assessment        Placement verified by: capnometry, equal breath sounds and chest rise        Number of attempts at approach: 1       Number of other approaches attempted: 0       Secured with: pink tape       Ease of procedure: easy       Dentition: Intact and Unchanged    Medication(s) Administered   Medication Administration Time: 4/24/2023 8:48 AM

## 2023-04-24 NOTE — Clinical Note
Potential access sites were evaluated for patency using ultrasound.   The epicardial was selected. Access was obtained under with Sonosite guidance using a micropuncture 21 gauge needle with direct visualization of needle entry.

## 2023-04-24 NOTE — ANESTHESIA PREPROCEDURE EVALUATION
Anesthesia Pre-Procedure Evaluation    Patient: Ryan Morfin   MRN: [id number] : 1985        Procedure : Procedure(s):  EP Ablation VT          Past Medical History:   Diagnosis Date     Hx of cardiac arrest      Pacemaker       Past Surgical History:   Procedure Laterality Date     EP ICD INSERT DUAL        No Known Allergies   Social History     Tobacco Use     Smoking status: Former     Types: Cigarettes, Vaping Device     Smokeless tobacco: Never   Vaping Use     Vaping status: Not on file   Substance Use Topics     Alcohol use: Not Currently      Wt Readings from Last 1 Encounters:   23 137.7 kg (303 lb 9.2 oz)        Mr Morfin is a 36 yo male patient with PMHx significant for out of hospital cardiac arrest due to VT with no clear etiology otherwise, s/p ICD 2022. He is here for VT management.     He has history of out of hospital cardiac arrest last fall 2022. He was at home sitting and watching TV and he had a witnessed cardiac arrest. He had CPR and required defibrillation. He was transferred to [place]. He had a LHC done that showed no significant CAD. ECHO showed EF 55-60%, no wall motion abnormalities. He did undergo cardiac MRI that showed LV EF 41%. During his hospitalization he had further monomorphic VT re quiring 2 shocks prior to ROSC. Telemetry showed monomorphic VT. He did undergo single chamber ICD implant for secondary prevention and was placed on amiodarone.     Anesthesia Evaluation   Pt has not had prior anesthetic         ROS/MED HX  ENT/Pulmonary:  - neg pulmonary ROS   (+) JORDY risk factors,     Neurologic:     (+) seizures, last seizure: 20ish,     Cardiovascular:     (+) Dyslipidemia hypertension-----pacemaker, ICD Irregular Heartbeat/Palpitations, Previous cardiac testing   Echo: Date: Results:  Echo 2022:      Left Ventricle: The left ventricle appears normal in size. Wall thickness is normal. Normal left ventricular systolic function. The EF is visually  estimated to be 55-60%. There are no wall motion abnormalities.      Right Ventricle: The right ventricle appears normal in size.      Left Atrium: The left atrium is normal in size.      Pulmonic Artery: There is no pulmonary hypertension.   Stress Test: Date: Results:    ECG Reviewed: Date: Results:    Cath: Date: Results:  Coronary Angiogram 9/6/2022:  Conclusions:   1. No significant CAD.   2. Elevated LV EDP.     METS/Exercise Tolerance: >4 METS    Hematologic:  - neg hematologic  ROS     Musculoskeletal:       GI/Hepatic:  - neg GI/hepatic ROS     Renal/Genitourinary:  - neg Renal ROS     Endo:  - neg endo ROS     Psychiatric/Substance Use:       Infectious Disease:  - neg infectious disease ROS     Malignancy:       Other:  - neg other ROS          Physical Exam    Airway        Mallampati: II   TM distance: > 3 FB   Neck ROM: full   Mouth opening: > 3 cm    Respiratory Devices and Support         Dental       (+) Minor Abnormalities - some fillings, tiny chips      Cardiovascular          Rhythm and rate: regular and normal     Pulmonary           (+) decreased breath sounds           OUTSIDE LABS:  CBC:   Lab Results   Component Value Date    WBC 6.9 04/24/2023    HGB 14.9 04/24/2023    HCT 44.8 04/24/2023     04/24/2023     BMP: No results found for: NA, POTASSIUM, CHLORIDE, CO2, BUN, CR, GLC  COAGS: No results found for: PTT, INR, FIBR  POC: No results found for: BGM, HCG, HCGS  HEPATIC: No results found for: ALBUMIN, PROTTOTAL, ALT, AST, GGT, ALKPHOS, BILITOTAL, BILIDIRECT, JACQUELYN  OTHER: No results found for: PH, LACT, A1C, MARGOTH, PHOS, MAG, LIPASE, AMYLASE, TSH, T4, T3, CRP, SED    Anesthesia Plan    ASA Status:  3      Anesthesia Type: General.     - Airway: ETT   Induction: Intravenous.      Techniques and Equipment:     - Lines/Monitors: 2nd IV     Consents         - Extended Intubation/Ventilatory Support Discussed: No.      - Patient is DNR/DNI Status: No    Use of blood products discussed: No  .     Postoperative Care    Pain management: Multi-modal analgesia.   PONV prophylaxis: Ondansetron (or other 5HT-3), Dexamethasone or Solumedrol     Comments:                Jimmy Shafer MD

## 2023-04-24 NOTE — DISCHARGE INSTRUCTIONS
Post-Ablation Discharge Instructions    Plan:   Stop amiodarone  Start Sotalol 80 mg twice daily on 5/8/23 with ECG and labs done on 5/10/23 locally  Take Colchicine 0.6 mg daily for 1 month  Can use ibuprofen 400 mg three times daily for any chest tightness with breathing  Follow up in 3 months via video visit    Care of groin site:        Remove the Band-Aid after 24 hours. If there is minor oozing, apply another Band-aid and remove it after 12 hours.         Do NOT take a bath, use a hot tub, pool, or submerse in water for at least 3 days You may shower.         It is normal to have a small bruise or lump at the site.        Do not scrub the site.        Do not use lotion or powder near the puncture site for 3 days.        For the first 2 days: Do not stoop or squat. When you cough, sneeze or move your bowels, hold your hand over the puncture site and press gently.        Do not lift more than 10 pounds or exertional activity for 10 days.      If you start bleeding from the site in your groin:  Lie down flat and press firmly on the site.  Call your physician immediately, or, come to the emergency room.    Call 911 right away if you have bleeding that is heavy or does not stop.     Call your doctor/provider if:        You have a large or growing hard lump around the site.        The site is red, swollen, hot or tender.        Blood or fluid is draining from the site.        You have chills or a fever greater than 101 F (38 C).        Your leg or arm turns bluish, feels numb or cool.        You have hives, a rash or unusual itching.     Cardiovascular Clinic:   [address] [place] [address]. [place] [address]    Your Care Team:  EP Cardiology   Telephone Number     Nurses:   Sydnee NAILS [phone]    After business hours: [phone], select option 4, and ask for EP Fellow on-call to be paged.   Non-procedure scheduling:    Brandi ALLISON   [phone]   Procedure scheduling:    Kelsey Fabian   [phone]  [phone]   Device Clinic (Pacemakers, ICDs, Loop Recorders)    During business hours: [phone]  After business hours:   [phone]- select option 4 and ask for job code [id number].       As always, Thank you for trusting us with your health care needs

## 2023-04-25 ENCOUNTER — ANCILLARY PROCEDURE (OUTPATIENT)
Dept: CARDIOLOGY | Facility: CLINIC | Age: 38
End: 2023-04-25
Attending: INTERNAL MEDICINE
Payer: COMMERCIAL

## 2023-04-25 ENCOUNTER — APPOINTMENT (OUTPATIENT)
Dept: CARDIOLOGY | Facility: CLINIC | Age: 38
End: 2023-04-25
Attending: NURSE PRACTITIONER
Payer: COMMERCIAL

## 2023-04-25 VITALS
RESPIRATION RATE: 15 BRPM | OXYGEN SATURATION: 96 % | SYSTOLIC BLOOD PRESSURE: 107 MMHG | TEMPERATURE: 97.7 F | HEART RATE: 56 BPM | DIASTOLIC BLOOD PRESSURE: 73 MMHG | HEIGHT: 71 IN | WEIGHT: 288.8 LBS | BODY MASS INDEX: 40.43 KG/M2

## 2023-04-25 DIAGNOSIS — I47.20 VT (VENTRICULAR TACHYCARDIA) (H): Primary | ICD-10-CM

## 2023-04-25 DIAGNOSIS — I47.20 VT (VENTRICULAR TACHYCARDIA) (H): ICD-10-CM

## 2023-04-25 LAB
ATRIAL RATE - MUSE: 68 BPM
DIASTOLIC BLOOD PRESSURE - MUSE: NORMAL MMHG
INTERPRETATION ECG - MUSE: NORMAL
LVEF ECHO: NORMAL
P AXIS - MUSE: 19 DEGREES
PR INTERVAL - MUSE: 176 MS
QRS DURATION - MUSE: 122 MS
QT - MUSE: 426 MS
QTC - MUSE: 452 MS
R AXIS - MUSE: 6 DEGREES
SYSTOLIC BLOOD PRESSURE - MUSE: NORMAL MMHG
T AXIS - MUSE: 17 DEGREES
VENTRICULAR RATE- MUSE: 68 BPM

## 2023-04-25 PROCEDURE — 93321 DOPPLER ECHO F-UP/LMTD STD: CPT | Mod: 26 | Performed by: INTERNAL MEDICINE

## 2023-04-25 PROCEDURE — 99239 HOSP IP/OBS DSCHRG MGMT >30: CPT | Mod: FS | Performed by: INTERNAL MEDICINE

## 2023-04-25 PROCEDURE — C8924 2D TTE W OR W/O FOL W/CON,FU: HCPCS

## 2023-04-25 PROCEDURE — 93325 DOPPLER ECHO COLOR FLOW MAPG: CPT | Mod: 26 | Performed by: INTERNAL MEDICINE

## 2023-04-25 PROCEDURE — 93325 DOPPLER ECHO COLOR FLOW MAPG: CPT

## 2023-04-25 PROCEDURE — 93308 TTE F-UP OR LMTD: CPT | Mod: 26 | Performed by: INTERNAL MEDICINE

## 2023-04-25 PROCEDURE — 93282 PRGRMG EVAL IMPLANTABLE DFB: CPT

## 2023-04-25 PROCEDURE — 250N000013 HC RX MED GY IP 250 OP 250 PS 637: Performed by: NURSE PRACTITIONER

## 2023-04-25 PROCEDURE — 93282 PRGRMG EVAL IMPLANTABLE DFB: CPT | Mod: 26 | Performed by: INTERNAL MEDICINE

## 2023-04-25 PROCEDURE — 255N000002 HC RX 255 OP 636: Performed by: INTERNAL MEDICINE

## 2023-04-25 RX ORDER — COLCHICINE 0.6 MG/1
0.6 TABLET ORAL 2 TIMES DAILY
Qty: 60 TABLET | Refills: 0 | Status: SHIPPED | OUTPATIENT
Start: 2023-04-25 | End: 2023-04-25

## 2023-04-25 RX ORDER — SOTALOL HYDROCHLORIDE 80 MG/1
80 TABLET ORAL 2 TIMES DAILY
Qty: 180 TABLET | Refills: 0 | Status: SHIPPED | OUTPATIENT
Start: 2023-05-08 | End: 2023-07-26

## 2023-04-25 RX ORDER — COLCHICINE 0.6 MG/1
0.6 TABLET ORAL DAILY
Qty: 60 TABLET | Refills: 0 | Status: SHIPPED | OUTPATIENT
Start: 2023-04-25

## 2023-04-25 RX ORDER — LORAZEPAM 1 MG/1
1 TABLET ORAL DAILY PRN
COMMUNITY

## 2023-04-25 RX ORDER — COLCHICINE 0.6 MG/1
0.6 TABLET ORAL DAILY
Status: DISCONTINUED | OUTPATIENT
Start: 2023-04-25 | End: 2023-04-25 | Stop reason: HOSPADM

## 2023-04-25 RX ORDER — COLCHICINE 0.6 MG/1
0.6 TABLET ORAL 2 TIMES DAILY
Status: DISCONTINUED | OUTPATIENT
Start: 2023-04-25 | End: 2023-04-25

## 2023-04-25 RX ADMIN — METOPROLOL TARTRATE 100 MG: 100 TABLET, FILM COATED ORAL at 07:40

## 2023-04-25 RX ADMIN — COLCHICINE 0.6 MG: 0.6 TABLET ORAL at 09:24

## 2023-04-25 RX ADMIN — AMIODARONE HYDROCHLORIDE 200 MG: 200 TABLET ORAL at 07:40

## 2023-04-25 RX ADMIN — HUMAN ALBUMIN MICROSPHERES AND PERFLUTREN 5 ML: 10; .22 INJECTION, SOLUTION INTRAVENOUS at 09:34

## 2023-04-25 ASSESSMENT — ACTIVITIES OF DAILY LIVING (ADL)
ADLS_ACUITY_SCORE: 37

## 2023-04-25 NOTE — PLAN OF CARE
Discharged home w/ wife at 1220 via wheelchair. Discharge education provided & all questions answered. PIV removed. Discharge meds picked up & belongings sent w/ patient.

## 2023-04-25 NOTE — DISCHARGE SUMMARY
Electrophysiology Discharge Summary  Date of Procedure: 4/24/23  DOS: 4/25/2023   Pre-operative Diagnosis: Non-ischemic VT, EF 45%.  Post-operative diagnosis: Successful ablation of epicardial VT    Hospital Course:  Mr Morfin is a 37 year old male patient who has a medical history significant for out of hospital cardiac arrest due to VT with no clear etiology otherwise, s/p ICD 9/2022.      He has history of out of hospital cardiac arrest last fall 9/4/2022. He was at home sitting and watching TV and he had a witnessed cardiac arrest. He had CPR and required defibrillation. He was transferred to [place]. He had a LHC done that showed no significant CAD. ECHO showed EF 55-60%, no wall motion abnormalities. He did undergo cardiac MRI that showed LV EF 41%. During his hospitalization he had further monomorphic VT re quiring 2 shocks prior to ROSC. Telemetry showed monomorphic VT. He did undergo single chamber ICD implant for secondary prevention and was placed on amiodarone. On 12/31/22 he had an apprpriate ICD shock for monomorphic VT. This was successful. In January he did go to the [place] ER with symptoms of chest pain and chest tightness. He was treated for anxiety. ICD programmed for one VF zone at 200 bpm.On 2/2/23, he was traveling in the vehicle when he started to have symptoms of chest tightness and palpitations, he reports feeling like he was going to have a panic attack. he then received ICD shock. They went to the ED in [place]. He received another appropriate shock for monomorphic VT. Thus his 2 shocks on February 2nd were appropriate. He was kept in [place] and reports that he was loaded with IV amiodarone. His potassium level was 3.6 so he was given IV potassium and IV magnesium. ECHO done showed normal LV function. He had been on amiodarone 400 every day since 9/2022, and had ICD shocks while on 400 daily.  He has no family Hx of VT, father HF but not sure if had MIs, passed away from [place] 2020,  "estranged with father and does not know a lot about his family. Maternal GF had MI, passed away long time ago, unsure of reason.  The patient has had recurrences of mostly monomorphic VT that is fast around 210 bpm.  His work-up for the etiology has been negative.  He has had a negative cardiac angiogram and a cardiac MRI showing no LGE, with only some LV dysfunction on the MRI.  An echocardiogram done afterwards shows a normal ejection fraction.  We will ask for the MRI images to review in our center to make sure if we need to repeat CMR.  We discussed the options for his arrhythmias.  He is young and we would like not to use amiodarone long-term for his arrhythmias.  The alternative would be switching to sotalol.  The other option would be to try an EP study and VT ablation.      Patient underwent a successful epicardial VT ablation yesterday. He had an uneventful overnight stay. Limited echo showed no pericardial effusion and normal LVEF. Groin sites are soft, CDI, no bruit, no bleeding, and no hematoma. Patient is tolerating oral intake, ambulating at baseline, and voiding without difficulties. Patient remains hemodynamically stable.     ROS:   10 point ROS neg other than the symptoms noted above.   Exam:  /73 (BP Location: Left arm)   Pulse 56   Temp 97.7  F (36.5  C) (Oral)   Resp 15   Ht 1.803 m (5' 11\")   Wt 131 kg (288 lb 12.8 oz)   SpO2 96%   BMI 40.28 kg/m      Constitutional: healthy, alert and no distress  Head: Normocephalic.   Neck: Neck supple.   ENT: ENT exam normal, no neck nodes or sinus tenderness  Cardiovascular: RRR. No murmurs, clicks gallops or rub  Respiratory: Good diaphragmatic excursion. Lungs clear  Gastrointestinal: Abdomen soft, non-tender. BS normal. No masses, organomegaly  : Deferred  Musculoskeletal: extremities normal- no gross deformities noted, gait normal and normal muscle tone  Skin: no suspicious lesions or rashes  Neurologic: Gait normal. Reflexes normal and " symmetric. Sensation grossly WNL.  Psychiatric: mentation appears normal and affect normal/bright    Discharge Medications:     Review of your medicines        START taking        Dose / Directions   colchicine 0.6 MG tablet  Commonly known as: COLCYRS      Dose: 0.6 mg  Take 1 tablet (0.6 mg) by mouth daily  Quantity: 60 tablet  Refills: 0     sotalol 80 MG tablet  Commonly known as: BETAPACE      Dose: 80 mg  Start taking on: May 8, 2023  Take 1 tablet (80 mg) by mouth 2 times daily  Quantity: 180 tablet  Refills: 0            CONTINUE these medicines which have NOT CHANGED        Dose / Directions   betamethasone dipropionate 0.05 % external cream  Commonly known as: DIPROSONE      APPLY CREAM TOPICALLY TWICE DAILY FOR 2 TO 4 WEEKS TO AFFECTED AREAS ON LIMBS AND BEHIND EARS, THEN AS NEEDED  Refills: 0     folic acid 1 MG tablet  Commonly known as: FOLVITE      Dose: 1 mg  Take 1 mg by mouth every evening  Refills: 0     LORazepam 1 MG tablet  Commonly known as: ATIVAN      Dose: 1 mg  Take 1 mg by mouth daily as needed for anxiety  Refills: 0     metoprolol tartrate 100 MG tablet  Commonly known as: LOPRESSOR      Dose: 1 tablet  Take 1 tablet by mouth 2 times daily  Refills: 0     sertraline 50 MG tablet  Commonly known as: ZOLOFT      Dose: 50 mg  Take 50 mg by mouth every evening  Refills: 0     Tremfya 100 MG/ML Sopn  Generic drug: Guselkumab      Dose: 100 mg  Inject 100 mg Subcutaneous every 2 months Every 8 weeks  Refills: 0            STOP taking      amiodarone 200 MG tablet  Commonly known as: PACERONE                  Where to get your medicines        These medications were sent to [facility] - [place], [place] - [address] [address]      Phone: [phone]   colchicine 0.6 MG tablet  sotalol 80 MG tablet         Patient Instructions:    Care of groin site:        Remove the Band-Aid after 24 hours. If there is minor oozing, apply another  Band-aid and remove it after 12 hours.         Do NOT take a bath, use a hot tub, pool, or submerse in water for at least 3 days You may shower.         It is normal to have a small bruise or lump at the site.        Do not scrub the site.        Do not use lotion or powder near the puncture site for 3 days.        For the first 2 days: Do not stoop or squat. When you cough, sneeze or move your bowels, hold your hand over the puncture site and press gently.        Do not lift more than 10 pounds or exertional activity for 10 days.      If you start bleeding from the site in your groin:  Lie down flat and press firmly on the site.  Call your physician immediately, or, come to the emergency room.    Call 911 right away if you have bleeding that is heavy or does not stop.     Call your doctor/provider if:        You have a large or growing hard lump around the site.        The site is red, swollen, hot or tender.        Blood or fluid is draining from the site.        You have chills or a fever greater than 101 F (38 C).        Your leg or arm turns bluish, feels numb or cool.        You have hives, a rash or unusual itching.     Plan & Follow up:  Stop amiodarone  Start Sotalol 80 mg twice daily on 5/8/23 with ECG and labs done on 5/10/23 locally  Take Colchicine 0.6 mg daily for 1 month  Can use ibuprofen 400 mg three times daily for any chest tightness with breathing  Follow up in 3 months via video visit    The patient states understanding and is agreeable with the plan. This patient was seen and examined with Dr. Pacheco and the above note reflects our joint assessment and plan.   TALHA Chung CNP  Electrophysiology Consult Service  Pager: [phone]    SOHA Total time spent on patient visit, reviewing notes, imaging, labs, orders, and completing necessary documentation: 40 minutes.  >50% of visit spent on counseling patient and/or coordination of care.    EP STAFF NOTE  I have seen and examined the patient as part  of a shared visit with DARWIN Chung NP.  I agree with the note above. I reviewed today's vital signs and medications. I have reviewed and discussed with the advanced practice provider their physical examination, assessment, and plan   Briefly, s/p VT ablation, epicardial ablation  My key history/exam findings are: RRR.   The key management decisions made by me: keep amiodarone off, switch to sotalol, f/unit(s) as outpatient, consider weaning off sotalol if no recurrences..  Total time spent on patient visit, reviewing notes, imaging, labs, orders, and completing necessary documentation: 15 minutes.  >50% of visit spent on counseling patient and/or coordination of care.     Jose Daniel Pacheco MD [facility]  Cardiology - Electrophysiology

## 2023-04-25 NOTE — PHARMACY-ADMISSION MEDICATION HISTORY
Pharmacist Admission Medication History    Admission medication history is complete. The information provided in this note is only as accurate as the sources available at the time of the update.    Medication reconciliation/reorder completed by provider prior to medication history? Yes    Information Source(s): Patient and CareEverywhere/SureScripts via in-person    Pertinent Information:   -Patient is on Tremfya 100 mg SQ q8 weeks (last dose 4/20/23)  -Usual use of as needed lorazepam is 1 mg on most days    Changes made to PTA medication list:    Added: none    Deleted: None    Changed: added doses and times of day to existing medication orders, changed naa    Allergies reviewed with patient and updates made in EHR: yes    Medication History Completed By: Nayely Rocha [facility] 4/25/2023 7:32 AM    Prior to Admission medications    Medication Sig Last Dose Taking? Auth Provider Long Term End Date   amiodarone (PACERONE) 200 MG tablet Take 400 mg by mouth every morning Past Week Yes Reported, Patient Yes    betamethasone dipropionate (DIPROSONE) 0.05 % external cream APPLY CREAM TOPICALLY TWICE DAILY FOR 2 TO 4 WEEKS TO AFFECTED AREAS ON LIMBS AND BEHIND EARS, THEN AS NEEDED Past Month Yes Reported, Patient     folic acid (FOLVITE) 1 MG tablet Take 1 mg by mouth every evening 4/23/2023 at 1800 Yes Reported, Patient     LORazepam (ATIVAN) 1 MG tablet Take 1 mg by mouth daily as needed for anxiety  Yes Unknown, Entered By History     metoprolol tartrate (LOPRESSOR) 100 MG tablet Take 1 tablet by mouth 2 times daily 4/23/2023 at 1800 Yes Reported, Patient Yes    sertraline (ZOLOFT) 50 MG tablet Take 50 mg by mouth every evening 4/23/2023 at 1800 Yes Reported, Patient Yes    TREMFYA 100 MG/ML SOPN Inject 100 mg Subcutaneous every 2 months Every 8 weeks 4/20/2023  Reported, Patient

## 2023-04-25 NOTE — CARE PLAN
"Alert x4, calm, cooperative, a febrile, VSS, García in place and patent, groin access sites CDI/SFD/CMS intact, lung sounds clear, Periods of apnea with desaturation into the low 80's, 4 LPM overnight via NC, ETCO2 35-39,  SR/SD, bowel sounds active and audible, potential discharge today. Nursing will continue to monitor. /67   Pulse 74   Temp 97.9  F (36.6  C) (Oral)   Resp 14   Ht 1.803 m (5' 11\")   Wt 131 kg (288 lb 12.8 oz)   SpO2 97%   BMI 40.28 kg/m      "

## 2023-04-26 ENCOUNTER — MEDICAL CORRESPONDENCE (OUTPATIENT)
Dept: HEALTH INFORMATION MANAGEMENT | Facility: CLINIC | Age: 38
End: 2023-04-26

## 2023-04-30 LAB
MDC_IDC_EPISODE_DTM: NORMAL
MDC_IDC_EPISODE_DURATION: 12 S
MDC_IDC_EPISODE_DURATION: 13 S
MDC_IDC_EPISODE_DURATION: 18 S
MDC_IDC_EPISODE_DURATION: 21 S
MDC_IDC_EPISODE_DURATION: 28 S
MDC_IDC_EPISODE_DURATION: 35 S
MDC_IDC_EPISODE_DURATION: 360 S
MDC_IDC_EPISODE_DURATION: 360 S
MDC_IDC_EPISODE_DURATION: 38 S
MDC_IDC_EPISODE_DURATION: 600 S
MDC_IDC_EPISODE_DURATION: 840 S
MDC_IDC_EPISODE_DURATION: 960 S
MDC_IDC_EPISODE_ID: 1
MDC_IDC_EPISODE_ID: 10
MDC_IDC_EPISODE_ID: 11
MDC_IDC_EPISODE_ID: 12
MDC_IDC_EPISODE_ID: 2
MDC_IDC_EPISODE_ID: 3
MDC_IDC_EPISODE_ID: 4
MDC_IDC_EPISODE_ID: 5
MDC_IDC_EPISODE_ID: 6
MDC_IDC_EPISODE_ID: 7
MDC_IDC_EPISODE_ID: 8
MDC_IDC_EPISODE_ID: 9
MDC_IDC_EPISODE_THERAPY_RESULT: NORMAL
MDC_IDC_EPISODE_TYPE: NORMAL
MDC_IDC_LEAD_IMPLANT_DT: NORMAL
MDC_IDC_LEAD_IMPLANT_DT: NORMAL
MDC_IDC_LEAD_LOCATION: NORMAL
MDC_IDC_LEAD_LOCATION: NORMAL
MDC_IDC_LEAD_LOCATION_DETAIL_1: NORMAL
MDC_IDC_LEAD_LOCATION_DETAIL_1: NORMAL
MDC_IDC_LEAD_MFG: NORMAL
MDC_IDC_LEAD_MFG: NORMAL
MDC_IDC_LEAD_MODEL: NORMAL
MDC_IDC_LEAD_MODEL: NORMAL
MDC_IDC_LEAD_POLARITY_TYPE: NORMAL
MDC_IDC_LEAD_POLARITY_TYPE: NORMAL
MDC_IDC_LEAD_SERIAL: NORMAL
MDC_IDC_LEAD_SERIAL: NORMAL
MDC_IDC_MSMT_BATTERY_DTM: NORMAL
MDC_IDC_MSMT_BATTERY_DTM: NORMAL
MDC_IDC_MSMT_BATTERY_REMAINING_LONGEVITY: 152 MO
MDC_IDC_MSMT_BATTERY_REMAINING_LONGEVITY: 152 MO
MDC_IDC_MSMT_BATTERY_RRT_TRIGGER: NORMAL
MDC_IDC_MSMT_BATTERY_RRT_TRIGGER: NORMAL
MDC_IDC_MSMT_BATTERY_STATUS: NORMAL
MDC_IDC_MSMT_BATTERY_STATUS: NORMAL
MDC_IDC_MSMT_BATTERY_VOLTAGE: 3.05 V
MDC_IDC_MSMT_BATTERY_VOLTAGE: 3.05 V
MDC_IDC_MSMT_CAP_CHARGE_DTM: NORMAL
MDC_IDC_MSMT_CAP_CHARGE_DTM: NORMAL
MDC_IDC_MSMT_CAP_CHARGE_ENERGY: 40 J
MDC_IDC_MSMT_CAP_CHARGE_ENERGY: 40 J
MDC_IDC_MSMT_CAP_CHARGE_TIME: 10.2 S
MDC_IDC_MSMT_CAP_CHARGE_TIME: 10.2 S
MDC_IDC_MSMT_CAP_CHARGE_TYPE: NORMAL
MDC_IDC_MSMT_CAP_CHARGE_TYPE: NORMAL
MDC_IDC_MSMT_LEADCHNL_RV_IMPEDANCE_VALUE: 418 OHM
MDC_IDC_MSMT_LEADCHNL_RV_IMPEDANCE_VALUE: 437 OHM
MDC_IDC_MSMT_LEADCHNL_RV_PACING_THRESHOLD_AMPLITUDE: 0.75 V
MDC_IDC_MSMT_LEADCHNL_RV_PACING_THRESHOLD_AMPLITUDE: 1 V
MDC_IDC_MSMT_LEADCHNL_RV_PACING_THRESHOLD_PULSEWIDTH: 0.4 MS
MDC_IDC_MSMT_LEADCHNL_RV_PACING_THRESHOLD_PULSEWIDTH: 0.4 MS
MDC_IDC_MSMT_LEADCHNL_RV_SENSING_INTR_AMPL: 15.6 MV
MDC_IDC_MSMT_LEADCHNL_RV_SENSING_INTR_AMPL: 16.5 MV
MDC_IDC_PG_IMPLANT_DTM: NORMAL
MDC_IDC_PG_IMPLANT_DTM: NORMAL
MDC_IDC_PG_MFG: NORMAL
MDC_IDC_PG_MFG: NORMAL
MDC_IDC_PG_MODEL: NORMAL
MDC_IDC_PG_MODEL: NORMAL
MDC_IDC_PG_SERIAL: NORMAL
MDC_IDC_PG_SERIAL: NORMAL
MDC_IDC_PG_TYPE: NORMAL
MDC_IDC_PG_TYPE: NORMAL
MDC_IDC_SESS_CLINIC_NAME: NORMAL
MDC_IDC_SESS_CLINIC_NAME: NORMAL
MDC_IDC_SESS_DTM: NORMAL
MDC_IDC_SESS_DTM: NORMAL
MDC_IDC_SESS_TYPE: NORMAL
MDC_IDC_SESS_TYPE: NORMAL
MDC_IDC_SET_BRADY_HYSTRATE: NORMAL
MDC_IDC_SET_BRADY_HYSTRATE: NORMAL
MDC_IDC_SET_BRADY_LOWRATE: 40 {BEATS}/MIN
MDC_IDC_SET_BRADY_LOWRATE: 40 {BEATS}/MIN
MDC_IDC_SET_BRADY_MODE: NORMAL
MDC_IDC_SET_BRADY_MODE: NORMAL
MDC_IDC_SET_LEADCHNL_RV_PACING_AMPLITUDE: 2 V
MDC_IDC_SET_LEADCHNL_RV_PACING_AMPLITUDE: 2 V
MDC_IDC_SET_LEADCHNL_RV_PACING_ANODE_ELECTRODE_1: NORMAL
MDC_IDC_SET_LEADCHNL_RV_PACING_ANODE_ELECTRODE_1: NORMAL
MDC_IDC_SET_LEADCHNL_RV_PACING_ANODE_LOCATION_1: NORMAL
MDC_IDC_SET_LEADCHNL_RV_PACING_ANODE_LOCATION_1: NORMAL
MDC_IDC_SET_LEADCHNL_RV_PACING_CAPTURE_MODE: NORMAL
MDC_IDC_SET_LEADCHNL_RV_PACING_CAPTURE_MODE: NORMAL
MDC_IDC_SET_LEADCHNL_RV_PACING_CATHODE_ELECTRODE_1: NORMAL
MDC_IDC_SET_LEADCHNL_RV_PACING_CATHODE_ELECTRODE_1: NORMAL
MDC_IDC_SET_LEADCHNL_RV_PACING_CATHODE_LOCATION_1: NORMAL
MDC_IDC_SET_LEADCHNL_RV_PACING_CATHODE_LOCATION_1: NORMAL
MDC_IDC_SET_LEADCHNL_RV_PACING_POLARITY: NORMAL
MDC_IDC_SET_LEADCHNL_RV_PACING_POLARITY: NORMAL
MDC_IDC_SET_LEADCHNL_RV_PACING_PULSEWIDTH: 0.4 MS
MDC_IDC_SET_LEADCHNL_RV_PACING_PULSEWIDTH: 0.4 MS
MDC_IDC_SET_LEADCHNL_RV_SENSING_ANODE_ELECTRODE_1: NORMAL
MDC_IDC_SET_LEADCHNL_RV_SENSING_ANODE_ELECTRODE_1: NORMAL
MDC_IDC_SET_LEADCHNL_RV_SENSING_ANODE_LOCATION_1: NORMAL
MDC_IDC_SET_LEADCHNL_RV_SENSING_ANODE_LOCATION_1: NORMAL
MDC_IDC_SET_LEADCHNL_RV_SENSING_CATHODE_ELECTRODE_1: NORMAL
MDC_IDC_SET_LEADCHNL_RV_SENSING_CATHODE_ELECTRODE_1: NORMAL
MDC_IDC_SET_LEADCHNL_RV_SENSING_CATHODE_LOCATION_1: NORMAL
MDC_IDC_SET_LEADCHNL_RV_SENSING_CATHODE_LOCATION_1: NORMAL
MDC_IDC_SET_LEADCHNL_RV_SENSING_POLARITY: NORMAL
MDC_IDC_SET_LEADCHNL_RV_SENSING_POLARITY: NORMAL
MDC_IDC_SET_LEADCHNL_RV_SENSING_SENSITIVITY: 0.3 MV
MDC_IDC_SET_LEADCHNL_RV_SENSING_SENSITIVITY: 0.3 MV
MDC_IDC_SET_ZONE_DETECTION_BEATS_DENOMINATOR: 40 {BEATS}
MDC_IDC_SET_ZONE_DETECTION_BEATS_DENOMINATOR: 40 {BEATS}
MDC_IDC_SET_ZONE_DETECTION_BEATS_NUMERATOR: 30 {BEATS}
MDC_IDC_SET_ZONE_DETECTION_BEATS_NUMERATOR: 30 {BEATS}
MDC_IDC_SET_ZONE_DETECTION_INTERVAL: 300 MS
MDC_IDC_SET_ZONE_DETECTION_INTERVAL: 300 MS
MDC_IDC_SET_ZONE_DETECTION_INTERVAL: 330 MS
MDC_IDC_SET_ZONE_DETECTION_INTERVAL: 330 MS
MDC_IDC_SET_ZONE_DETECTION_INTERVAL: 360 MS
MDC_IDC_SET_ZONE_DETECTION_INTERVAL: 360 MS
MDC_IDC_SET_ZONE_TYPE: NORMAL
MDC_IDC_STAT_AT_BURDEN_PERCENT: 0 %
MDC_IDC_STAT_AT_BURDEN_PERCENT: 0 %
MDC_IDC_STAT_AT_DTM_END: NORMAL
MDC_IDC_STAT_AT_DTM_END: NORMAL
MDC_IDC_STAT_AT_DTM_START: NORMAL
MDC_IDC_STAT_AT_DTM_START: NORMAL
MDC_IDC_STAT_BRADY_DTM_END: NORMAL
MDC_IDC_STAT_BRADY_DTM_END: NORMAL
MDC_IDC_STAT_BRADY_DTM_START: NORMAL
MDC_IDC_STAT_BRADY_DTM_START: NORMAL
MDC_IDC_STAT_BRADY_RA_PERCENT_PACED: NORMAL
MDC_IDC_STAT_BRADY_RA_PERCENT_PACED: NORMAL
MDC_IDC_STAT_BRADY_RV_PERCENT_PACED: 0.21 %
MDC_IDC_STAT_BRADY_RV_PERCENT_PACED: 0.21 %
MDC_IDC_STAT_CRT_DTM_END: NORMAL
MDC_IDC_STAT_CRT_DTM_END: NORMAL
MDC_IDC_STAT_CRT_DTM_START: NORMAL
MDC_IDC_STAT_CRT_DTM_START: NORMAL
MDC_IDC_STAT_EPISODE_RECENT_COUNT: 0
MDC_IDC_STAT_EPISODE_RECENT_COUNT_DTM_END: NORMAL
MDC_IDC_STAT_EPISODE_RECENT_COUNT_DTM_START: NORMAL
MDC_IDC_STAT_EPISODE_TOTAL_COUNT: 0
MDC_IDC_STAT_EPISODE_TOTAL_COUNT: 2
MDC_IDC_STAT_EPISODE_TOTAL_COUNT: 2
MDC_IDC_STAT_EPISODE_TOTAL_COUNT: 5
MDC_IDC_STAT_EPISODE_TOTAL_COUNT: 5
MDC_IDC_STAT_EPISODE_TOTAL_COUNT_DTM_END: NORMAL
MDC_IDC_STAT_EPISODE_TOTAL_COUNT_DTM_START: NORMAL
MDC_IDC_STAT_EPISODE_TYPE: NORMAL
MDC_IDC_STAT_TACHYTHERAPY_ATP_DELIVERED_RECENT: 0
MDC_IDC_STAT_TACHYTHERAPY_ATP_DELIVERED_RECENT: 0
MDC_IDC_STAT_TACHYTHERAPY_ATP_DELIVERED_TOTAL: 5
MDC_IDC_STAT_TACHYTHERAPY_ATP_DELIVERED_TOTAL: 5
MDC_IDC_STAT_TACHYTHERAPY_RECENT_DTM_END: NORMAL
MDC_IDC_STAT_TACHYTHERAPY_RECENT_DTM_END: NORMAL
MDC_IDC_STAT_TACHYTHERAPY_RECENT_DTM_START: NORMAL
MDC_IDC_STAT_TACHYTHERAPY_RECENT_DTM_START: NORMAL
MDC_IDC_STAT_TACHYTHERAPY_SHOCKS_ABORTED_RECENT: 0
MDC_IDC_STAT_TACHYTHERAPY_SHOCKS_ABORTED_RECENT: 0
MDC_IDC_STAT_TACHYTHERAPY_SHOCKS_ABORTED_TOTAL: 2
MDC_IDC_STAT_TACHYTHERAPY_SHOCKS_ABORTED_TOTAL: 2
MDC_IDC_STAT_TACHYTHERAPY_SHOCKS_DELIVERED_RECENT: 0
MDC_IDC_STAT_TACHYTHERAPY_SHOCKS_DELIVERED_RECENT: 0
MDC_IDC_STAT_TACHYTHERAPY_SHOCKS_DELIVERED_TOTAL: 3
MDC_IDC_STAT_TACHYTHERAPY_SHOCKS_DELIVERED_TOTAL: 3
MDC_IDC_STAT_TACHYTHERAPY_TOTAL_DTM_END: NORMAL
MDC_IDC_STAT_TACHYTHERAPY_TOTAL_DTM_END: NORMAL
MDC_IDC_STAT_TACHYTHERAPY_TOTAL_DTM_START: NORMAL
MDC_IDC_STAT_TACHYTHERAPY_TOTAL_DTM_START: NORMAL

## 2023-05-01 LAB
MDC_IDC_LEAD_IMPLANT_DT: NORMAL
MDC_IDC_LEAD_LOCATION: NORMAL
MDC_IDC_LEAD_LOCATION_DETAIL_1: NORMAL
MDC_IDC_LEAD_MFG: NORMAL
MDC_IDC_LEAD_MODEL: NORMAL
MDC_IDC_LEAD_POLARITY_TYPE: NORMAL
MDC_IDC_LEAD_SERIAL: NORMAL
MDC_IDC_MSMT_BATTERY_DTM: NORMAL
MDC_IDC_MSMT_BATTERY_REMAINING_LONGEVITY: 152 MO
MDC_IDC_MSMT_BATTERY_RRT_TRIGGER: NORMAL
MDC_IDC_MSMT_BATTERY_VOLTAGE: 2.95 V
MDC_IDC_MSMT_CAP_CHARGE_DTM: NORMAL
MDC_IDC_MSMT_CAP_CHARGE_ENERGY: 40 J
MDC_IDC_MSMT_CAP_CHARGE_TIME: 10.2 S
MDC_IDC_MSMT_CAP_CHARGE_TYPE: NORMAL
MDC_IDC_MSMT_LEADCHNL_RV_IMPEDANCE_VALUE: 342 OHM
MDC_IDC_MSMT_LEADCHNL_RV_IMPEDANCE_VALUE: 437 OHM
MDC_IDC_MSMT_LEADCHNL_RV_PACING_THRESHOLD_AMPLITUDE: 0.62 V
MDC_IDC_MSMT_LEADCHNL_RV_PACING_THRESHOLD_AMPLITUDE: 0.75 V
MDC_IDC_MSMT_LEADCHNL_RV_PACING_THRESHOLD_PULSEWIDTH: 0.4 MS
MDC_IDC_MSMT_LEADCHNL_RV_PACING_THRESHOLD_PULSEWIDTH: 0.4 MS
MDC_IDC_MSMT_LEADCHNL_RV_SENSING_INTR_AMPL: 16 MV
MDC_IDC_PG_IMPLANT_DTM: NORMAL
MDC_IDC_PG_MFG: NORMAL
MDC_IDC_PG_MODEL: NORMAL
MDC_IDC_PG_SERIAL: NORMAL
MDC_IDC_PG_TYPE: NORMAL
MDC_IDC_SESS_CLINIC_NAME: NORMAL
MDC_IDC_SESS_DTM: NORMAL
MDC_IDC_SESS_TYPE: NORMAL
MDC_IDC_SET_BRADY_HYSTRATE: NORMAL
MDC_IDC_SET_BRADY_LOWRATE: 40 {BEATS}/MIN
MDC_IDC_SET_BRADY_MODE: NORMAL
MDC_IDC_SET_LEADCHNL_RV_PACING_AMPLITUDE: 2 V
MDC_IDC_SET_LEADCHNL_RV_PACING_ANODE_ELECTRODE_1: NORMAL
MDC_IDC_SET_LEADCHNL_RV_PACING_ANODE_LOCATION_1: NORMAL
MDC_IDC_SET_LEADCHNL_RV_PACING_CAPTURE_MODE: NORMAL
MDC_IDC_SET_LEADCHNL_RV_PACING_CATHODE_ELECTRODE_1: NORMAL
MDC_IDC_SET_LEADCHNL_RV_PACING_CATHODE_LOCATION_1: NORMAL
MDC_IDC_SET_LEADCHNL_RV_PACING_POLARITY: NORMAL
MDC_IDC_SET_LEADCHNL_RV_PACING_PULSEWIDTH: 0.4 MS
MDC_IDC_SET_LEADCHNL_RV_SENSING_ANODE_ELECTRODE_1: NORMAL
MDC_IDC_SET_LEADCHNL_RV_SENSING_ANODE_LOCATION_1: NORMAL
MDC_IDC_SET_LEADCHNL_RV_SENSING_CATHODE_ELECTRODE_1: NORMAL
MDC_IDC_SET_LEADCHNL_RV_SENSING_CATHODE_LOCATION_1: NORMAL
MDC_IDC_SET_LEADCHNL_RV_SENSING_POLARITY: NORMAL
MDC_IDC_SET_LEADCHNL_RV_SENSING_SENSITIVITY: 0.3 MV
MDC_IDC_SET_ZONE_DETECTION_BEATS_DENOMINATOR: 40 {BEATS}
MDC_IDC_SET_ZONE_DETECTION_BEATS_NUMERATOR: 30 {BEATS}
MDC_IDC_SET_ZONE_DETECTION_INTERVAL: 300 MS
MDC_IDC_SET_ZONE_DETECTION_INTERVAL: 330 MS
MDC_IDC_SET_ZONE_DETECTION_INTERVAL: 360 MS
MDC_IDC_SET_ZONE_TYPE: NORMAL
MDC_IDC_STAT_AT_BURDEN_PERCENT: 0 %
MDC_IDC_STAT_AT_DTM_END: NORMAL
MDC_IDC_STAT_AT_DTM_START: NORMAL
MDC_IDC_STAT_BRADY_DTM_END: NORMAL
MDC_IDC_STAT_BRADY_DTM_START: NORMAL
MDC_IDC_STAT_BRADY_RV_PERCENT_PACED: 0.01 %
MDC_IDC_STAT_CRT_DTM_END: NORMAL
MDC_IDC_STAT_CRT_DTM_START: NORMAL
MDC_IDC_STAT_EPISODE_RECENT_COUNT: 0
MDC_IDC_STAT_EPISODE_RECENT_COUNT_DTM_END: NORMAL
MDC_IDC_STAT_EPISODE_RECENT_COUNT_DTM_START: NORMAL
MDC_IDC_STAT_EPISODE_TOTAL_COUNT: 0
MDC_IDC_STAT_EPISODE_TOTAL_COUNT: 2
MDC_IDC_STAT_EPISODE_TOTAL_COUNT: 5
MDC_IDC_STAT_EPISODE_TOTAL_COUNT_DTM_END: NORMAL
MDC_IDC_STAT_EPISODE_TOTAL_COUNT_DTM_START: NORMAL
MDC_IDC_STAT_EPISODE_TYPE: NORMAL
MDC_IDC_STAT_TACHYTHERAPY_ATP_DELIVERED_RECENT: 0
MDC_IDC_STAT_TACHYTHERAPY_ATP_DELIVERED_TOTAL: 5
MDC_IDC_STAT_TACHYTHERAPY_RECENT_DTM_END: NORMAL
MDC_IDC_STAT_TACHYTHERAPY_RECENT_DTM_START: NORMAL
MDC_IDC_STAT_TACHYTHERAPY_SHOCKS_ABORTED_RECENT: 0
MDC_IDC_STAT_TACHYTHERAPY_SHOCKS_ABORTED_TOTAL: 2
MDC_IDC_STAT_TACHYTHERAPY_SHOCKS_DELIVERED_RECENT: 0
MDC_IDC_STAT_TACHYTHERAPY_SHOCKS_DELIVERED_TOTAL: 3
MDC_IDC_STAT_TACHYTHERAPY_TOTAL_DTM_END: NORMAL
MDC_IDC_STAT_TACHYTHERAPY_TOTAL_DTM_START: NORMAL

## 2023-05-11 ENCOUNTER — TRANSFERRED RECORDS (OUTPATIENT)
Dept: HEALTH INFORMATION MANAGEMENT | Facility: CLINIC | Age: 38
End: 2023-05-11
Payer: COMMERCIAL

## 2023-05-21 ENCOUNTER — HEALTH MAINTENANCE LETTER (OUTPATIENT)
Age: 38
End: 2023-05-21

## 2023-07-26 ENCOUNTER — VIRTUAL VISIT (OUTPATIENT)
Dept: CARDIOLOGY | Facility: CLINIC | Age: 38
End: 2023-07-26
Attending: INTERNAL MEDICINE
Payer: COMMERCIAL

## 2023-07-26 VITALS — HEIGHT: 72 IN | BODY MASS INDEX: 39.96 KG/M2 | WEIGHT: 295 LBS

## 2023-07-26 DIAGNOSIS — I47.20 VT (VENTRICULAR TACHYCARDIA) (H): ICD-10-CM

## 2023-07-26 PROCEDURE — 99214 OFFICE O/P EST MOD 30 MIN: CPT | Mod: GT | Performed by: INTERNAL MEDICINE

## 2023-07-26 RX ORDER — SOTALOL HYDROCHLORIDE 80 MG/1
40 TABLET ORAL 2 TIMES DAILY
Qty: 30 TABLET | Refills: 0 | Status: SHIPPED | OUTPATIENT
Start: 2023-07-26 | End: 2023-08-25

## 2023-07-26 RX ORDER — PANTOPRAZOLE SODIUM 40 MG/1
1 TABLET, DELAYED RELEASE ORAL
COMMUNITY
Start: 2023-06-08

## 2023-07-26 ASSESSMENT — PAIN SCALES - GENERAL: PAINLEVEL: NO PAIN (0)

## 2023-07-26 NOTE — Clinical Note
7/26/2023      RE: Ryan Schmidivett  [address] [place] [address]       Dear Colleague,    Thank you for the opportunity to participate in the care of your patient, Ryan Morfin, at the [facility]. Please see a copy of my visit note below.    No notes on file    Please do not hesitate to contact me if you have any questions/concerns.     Sincerely,     Jose Daniel Pacheco MD

## 2023-07-26 NOTE — PROGRESS NOTES
ELECTROPHYSIOLOGY VIRTUAL CLINIC VISIT    Assessment/Recommendations   Assessment/Plan:    Mr Morfin is a 38 yo male patient with PMHx significant for out of hospital cardiac arrest due to VT with no clear etiology otherwise, s/p ICD 9/2022 s/p epicardial VT ablation 4/24/23.     The patient had recurrences of mostly monomorphic VT that is fast around 210 bpm.  His work-up for the etiology has been negative.  He has had a negative cardiac angiogram and a cardiac MRI showing no LGE, with only some LV dysfunction on the MRI.  An echocardiogram done afterwards shows a normal ejection fraction. He had been on Sotalol and amiodarone. Management options were discussed and he elected to have an epicardial VT ablation on 4/24/23. He is off amiodarone and on Sotalol. No further VT since ablation. We will have him decrease Sotalol to 40 mg BID and then stop in 1 month.     Follow up with Dr Nieves as scheduled         History of Present Illness/Subjective    Mr. Ryan Morfin is a 37 year old male who comes in today for EP consultation for Vt management..    Mr Morfin is a 38 yo male patient with PMHx significant for out of hospital cardiac arrest due to VT with no clear etiology otherwise, s/p ICD 9/2022 s/p epicardial VT ablation 4/24/23. He is here for VT management.    He has history of out of hospital cardiac arrest last fall 9/4/2022. He was at home sitting and watching TV and he had a witnessed cardiac arrest. He had CPR and required defibrillation. He was transferred to [place]. He had a LHC done that showed no significant CAD. ECHO showed EF 55-60%, no wall motion abnormalities. He did undergo cardiac MRI that showed LV EF 41%. During his hospitalization he had further monomorphic VT re quiring 2 shocks prior to ROSC. Telemetry showed monomorphic VT. He did undergo single chamber ICD implant for secondary prevention and was placed on amiodarone.     On December 31st he had an apprpriate ICD shock for  monomorphic VT. This was successful. In January he did go to the [place] ED with symptoms of chest pain and chest tightness. He was treated for anxiety. ICD programmed for one VF zone at 200 bpm.    EP Visit 2/24/23: On February 2nd, he was traveling in the vehicle when he started to have symptoms of chest tightness and palpitations, he reports feeling like he was going to have a panic attack. he then received ICD shock. They went to the ED in [place]. He received another appropriate shock for monomorphic VT. Thus his 2 shocks on February 2nd were appropriate. He was kept in [place] and reports that he was loaded with IV amiodarone. His potassium level was 3.6 so he was given IV potassium and IV magnesium. ECHO done showed normal LV function. He has not had any further shocks. He does have at times chest tightness. He denies lightheaded, dizziness. He has not had genetic testing. He has been on amiodarone 400 every day since September 2022, and had ICD shocks while on 400 daily.  He has no family Hx of VT, father HF but not sure if had MIs, passed away from COVID 2020, estranged with father and dos not know a lot about his family. Maternal GF had MI, passed away long time ago, unsure of reason    He presents today for follow up. He had epicardial VT ablation 4/24/23. Access sites well healed. He stopped colchicine 2 weeks after ablation. No recurrent VT. He reports feeling well. He denies chest discomfort, palpitations, abdominal fullness/bloating or peripheral edema, shortness of breath, paroxysmal nocturnal dyspnea, orthopnea, lightheadedness, dizziness, pre-syncope, or syncope. Current cardiac medications include: Sotalol and Metoprolol.       I have reviewed and updated the patient's Past Medical History, Social History, Family History and Medication List.     Cardiographics (Personally Reviewed) :   Echo 9/4/2022:     Left Ventricle: The left ventricle appears normal in size. Wall thickness is normal. Normal  left ventricular systolic function. The EF is visually estimated to be 55-60%. There are no wall motion abnormalities.     Right Ventricle: The right ventricle appears normal in size.     Left Atrium: The left atrium is normal in size.     Pulmonic Artery: There is no pulmonary hypertension.     Coronary Angiogram 9/6/2022:  Conclusions:   1. No significant CAD.   2. Elevated LV EDP.     CMR 9/8/2022:  1.The left ventricle and right ventricle are normal in dimensions. Left atria and right atria are normal in dimensions. There is no LVH.   2.The left ventricular systolic function is mildly reduced (LV EF is 44%). Mild global hypokinesis is noted. The right ventricular systolic function is normal (RV EF is 45 %).   3.There are no significant valvular abnormalities.   4. Late gadolinium enhanced images are normal. There is no evidence of fibrosis or scar.   5.The pericardium is normal and free of effusions.                  Physical Examination   There were no vitals taken for this visit.  Wt Readings from Last 3 Encounters:   04/25/23 131 kg (288 lb 12.8 oz)     The rest of a comprehensive physical examination is deferred due to nature of video visit restrictions.  CONSITUTIONAL: no acute distress  HEENT: no icterus, no redness or discharge, neck supple  CV: no visible edema of visualized extremities. No JVD.   RESPIRATORY: respirations nonlabored, no cough  NEURO: AA&Ox3, speech fluent/appropriate, motor grossly nonfocal  PSYCH: cooperative, affect appropriate  DERM: no rashes on visualized face/neck/upper extremities         Medications  Allergies   Current Outpatient Medications   Medication Sig Dispense Refill    betamethasone dipropionate (DIPROSONE) 0.05 % external cream APPLY CREAM TOPICALLY TWICE DAILY FOR 2 TO 4 WEEKS TO AFFECTED AREAS ON LIMBS AND BEHIND EARS, THEN AS NEEDED      colchicine (COLCYRS) 0.6 MG tablet Take 1 tablet (0.6 mg) by mouth daily 60 tablet 0    folic acid (FOLVITE) 1 MG tablet Take 1 mg  by mouth every evening      LORazepam (ATIVAN) 1 MG tablet Take 1 mg by mouth daily as needed for anxiety      metoprolol tartrate (LOPRESSOR) 100 MG tablet Take 1 tablet by mouth 2 times daily      sertraline (ZOLOFT) 50 MG tablet Take 50 mg by mouth every evening      sotalol (BETAPACE) 80 MG tablet Take 1 tablet (80 mg) by mouth 2 times daily 180 tablet 0    TREMFYA 100 MG/ML SOPN Inject 100 mg Subcutaneous every 2 months Every 8 weeks      No Known Allergies      Lab Results (Personally Reviewed)    Chemistry/lipid CBC Cardiac Enzymes/BNP/TSH/INR   Lab Results   Component Value Date    BUN 18.1 04/24/2023     04/24/2023    CO2 18 (L) 04/24/2023     Creatinine   Date Value Ref Range Status   04/24/2023 0.93 0.67 - 1.17 mg/dL Final       No results found for: CHOL, HDL, LDL, CHOLHDL   Lab Results   Component Value Date    WBC 6.9 04/24/2023    HGB 14.9 04/24/2023    HCT 44.8 04/24/2023    MCV 94 04/24/2023     04/24/2023    No results found for: CKTOTAL, CKMB, TROPONINI, BNP, TSH, INR     Video-Visit Details    Type of service:  Video Visit   Video Start Time:  8:00 AM  Video End Time:8:30 AM    Originating Location (pt. Location): Home  Distant Location (provider location):  On-site  Platform used for Video Visit: Juli    I have reviewed the note as documented above.  This accurately captures the substance of my virtual visit with the patient. The patient states understanding and is agreeable with the plan.   Jose Daniel Pacheco MD [facility]  Cardiology - Electrophysiology        Total time spent on patient visit, reviewing notes, imaging, labs, orders, and completing necessary documentation: 30 minutes.  >50% of visit spent on counseling patient and/or coordination of care.

## 2023-07-26 NOTE — PATIENT INSTRUCTIONS
Plan:   Decrease sotalol to 40mg twice a day and stop in 1 month  Follow-up as needed      Your Care Team:  EP Cardiology   Telephone Number     Sydnee Kline RN [phone]    After business hours: [phone], ask for cardiologist on-call   Non-procedure scheduling:    Brandi ALLISON   [phone]   Procedure scheduling:    Kelsey Fabian   [phone]   Device Clinic (Pacemakers, ICDs, Loop Recorders)    During business hours: [phone]  After business hours:   [phone]- select option 4 and ask for job code [phone].       Cardiovascular Clinic:   [address] [place] [address]. [place] [address]      As always, thank you for trusting us with your health care needs!

## 2023-07-26 NOTE — NURSING NOTE
Chief Complaint   Patient presents with    Follow Up     3 month post ablation follow up, no updates per pt. Medications/allergies reconciled and reviewed with pt, pt now taking Pantoprazole.       Is the patient currently in the state of [place]? NO- [place]    Visit mode:VIDEO    If the visit is dropped, the patient can be reconnected by: VIDEO VISIT: Text to cell phone: [phone]    Will anyone else be joining the visit? NO      How would you like to obtain your AVS? MyChart    Are changes needed to the allergy or medication list? NO    Patient denies any changes since check-in regarding medication and allergies and states all information entered during check-in remains accurate.    Davie Gillespie, Visit Facilitator/MA.

## 2023-11-08 DIAGNOSIS — I47.20 VT (VENTRICULAR TACHYCARDIA) (H): ICD-10-CM

## 2023-11-13 RX ORDER — SOTALOL HYDROCHLORIDE 80 MG/1
40 TABLET ORAL
Qty: 30 TABLET | Refills: 0 | OUTPATIENT
Start: 2023-11-13

## 2023-11-21 DIAGNOSIS — I47.20 VT (VENTRICULAR TACHYCARDIA) (H): ICD-10-CM

## 2023-11-27 DIAGNOSIS — I47.20 VT (VENTRICULAR TACHYCARDIA) (H): ICD-10-CM

## 2023-11-27 RX ORDER — SOTALOL HYDROCHLORIDE 80 MG/1
40 TABLET ORAL
Qty: 30 TABLET | Refills: 0 | OUTPATIENT
Start: 2023-11-27

## 2023-11-27 NOTE — TELEPHONE ENCOUNTER
med not on active med list: END DATE  sotalol (BETAPACE) 80 MG tablet 30 tablet 0 7/26/2023 8/25/2023 No  Sig - Route: Take 0.5 tablets (40 mg) by mouth 2 times daily for 30 days - Oral       last rx: last clinic note:  We will have him decrease Sotalol to 40 mg BID and then stop in 1 month.   spoke w/ pharm: if pt requesting, pt should call clinic

## 2023-11-28 RX ORDER — SOTALOL HYDROCHLORIDE 80 MG/1
40 TABLET ORAL
Qty: 30 TABLET | Refills: 0 | OUTPATIENT
Start: 2023-11-28

## 2023-11-28 NOTE — TELEPHONE ENCOUNTER
"Refill request for sotalol: denied.     Per clinic visit on 7/26/23 with Dr Pacheco:  \"We will have him decrease Sotalol to 40 mg BID and then stop in 1 month.\"    " Glycopyrrolate Pregnancy And Lactation Text: This medication is Pregnancy Category B and is considered safe during pregnancy. It is unknown if it is excreted breast milk.

## 2023-11-29 ENCOUNTER — TELEPHONE (OUTPATIENT)
Dept: CARDIOLOGY | Facility: CLINIC | Age: 38
End: 2023-11-29
Payer: COMMERCIAL

## 2023-11-29 NOTE — TELEPHONE ENCOUNTER
[facility] Call Center    Phone Message    May a detailed message be left on voicemail: yes     Reason for Call: Medication Refill Request    Has the patient contacted the pharmacy for the refill? Yes   Name of medication being requested: Sotalol 80mg  Provider who prescribed the medication: Dr. Pacheco  Pharmacy: [facility] [id number] [facility] [address] [address] [facility]    Date medication is needed: 11/30   Pt currently out    Action Taken: Other: Cardiology    Travel Screening: Not Applicable    Thank you!  [facility]

## 2023-11-29 NOTE — TELEPHONE ENCOUNTER
Called and spoke to patient. Per Dr Pacheco's note 7/26/23, sotalol to be decreased to 40mg BID for 1 month and then stop. Patient reports decreasing the dose, but he has continued it until he recently ran out. Recommended patient stop sotalol now. Patient verbalized understanding and is in agreement to the plan.

## 2024-07-28 ENCOUNTER — HEALTH MAINTENANCE LETTER (OUTPATIENT)
Age: 39
End: 2024-07-28

## 2024-10-15 NOTE — H&P
Electrophysiology Pre-Procedure History and Physical    Ryan Morfin MRN# [id number]   Age: 37 year old YOB: 1985      Date of Procedure: 4/24/2023   [facility]      Date of Exam 4/24/2023 Facility (Same day)       Home clinic: [facility]  Primary care provider: System, Provider Not In  HPI:  Mr Morfin is a 37 year old male patient who has a medical history significant for out of hospital cardiac arrest due to VT with no clear etiology otherwise, s/p ICD 9/2022.      He has history of out of hospital cardiac arrest last fall 9/4/2022. He was at home sitting and watching TV and he had a witnessed cardiac arrest. He had CPR and required defibrillation. He was transferred to [place]. He had a LHC done that showed no significant CAD. ECHO showed EF 55-60%, no wall motion abnormalities. He did undergo cardiac MRI that showed LV EF 41%. During his hospitalization he had further monomorphic VT re quiring 2 shocks prior to ROSC. Telemetry showed monomorphic VT. He did undergo single chamber ICD implant for secondary prevention and was placed on amiodarone. On 12/31/22 he had an apprpriate ICD shock for monomorphic VT. This was successful. In January he did go to the [place] ER with symptoms of chest pain and chest tightness. He was treated for anxiety. ICD programmed for one VF zone at 200 bpm.On 2/2/23, he was traveling in the vehicle when he started to have symptoms of chest tightness and palpitations, he reports feeling like he was going to have a panic attack. he then received ICD shock. They went to the ED in [place]. He received another appropriate shock for monomorphic VT. Thus his 2 shocks on February 2nd were appropriate. He was kept in [place] and reports that he was loaded with IV amiodarone. His potassium level was 3.6 so he was given IV potassium and IV magnesium. ECHO done showed normal LV function. He had been on  amiodarone 400 every day since 9/2022, and had ICD shocks while on 400 daily.  He has no family Hx of VT, father HF but not sure if had MIs, passed away from COVID 2020, estranged with father and does not know a lot about his family. Maternal GF had MI, passed away long time ago, unsure of reason.  The patient has had recurrences of mostly monomorphic VT that is fast around 210 bpm.  His work-up for the etiology has been negative.  He has had a negative cardiac angiogram and a cardiac MRI showing no LGE, with only some LV dysfunction on the MRI.  An echocardiogram done afterwards shows a normal ejection fraction.  We will ask for the MRI images to review in our center to make sure if we need to repeat CMR.  We discussed the options for his arrhythmias.  He is young and we would like not to use amiodarone long-term for his arrhythmias.  The alternative would be switching to sotalol.  The other option would be to try an EP study and VT ablation.  He elected to pursue ablation for which he presents today.        Active problem list:     There are no problems to display for this patient.           Medications (include herbals and vitamins):      Current Facility-Administered Medications   Medication     lactated ringers infusion     lidocaine (LMX4) cream     lidocaine (LMX4) cream     lidocaine 1 % 0.1-1 mL     lidocaine 1 % 0.1-1 mL     sodium chloride (PF) 0.9% PF flush 3 mL     sodium chloride (PF) 0.9% PF flush 3 mL     sodium chloride (PF) 0.9% PF flush 3 mL     sodium chloride (PF) 0.9% PF flush 3 mL     sodium chloride (PF) 0.9% PF flush 3 mL     sodium chloride 0.9% infusion           Medication List      There are no discharge medications for this visit.              Allergies:    No Known Allergies          Social History:     Social History     Tobacco Use     Smoking status: Former     Types: Cigarettes, Vaping Device     Smokeless tobacco: Never   Vaping Use     Vaping status: Not on file   Substance Use  "Topics     Alcohol use: Not Currently            Physical Exam:   All vitals have been reviewed  Patient Vitals for the past 8 hrs:   BP Temp Temp src Pulse Resp SpO2 Height Weight   04/24/23 0636 137/84 98  F (36.7  C) Oral 61 15 95 % -- --   04/24/23 0629 -- -- -- -- -- -- 1.803 m (5' 11\") 137.7 kg (303 lb 9.2 oz)     No intake/output data recorded.  Airway assessment:   Patient is able to open mouth wide  Patient is able to stick out tongue}      ENT:   Normocephalic, without obvious abnormality, atraumatic, sinuses nontender on palpation, external ears without lesions, oral pharynx with moist mucous membranes, tonsils without erythema or exudates, gums normal and good dentition.     Neck:   Supple, symmetrical, trachea midline, no adenopathy, thyroid symmetric, not enlarged and no tenderness, skin normal     Lungs:   No increased work of breathing, good air exchange, clear to auscultation bilaterally, no crackles or wheezing     Cardiovascular:   Normal apical impulse, regular rate and rhythm, normal S1 and S2, no S3 or S4, and no murmur noted             Lab / Radiology Results:     Lab Results   Component Value Date    WBC 6.9 04/24/2023    RBC 4.76 04/24/2023    HGB 14.9 04/24/2023    HCT 44.8 04/24/2023    MCV 94 04/24/2023    RDW 12.3 04/24/2023     04/24/2023      Lab Results   Component Value Date    WBC 6.9 04/24/2023     Lab Results   Component Value Date     04/24/2023     Lab Results   Component Value Date    HGB 14.9 04/24/2023    HCT 44.8 04/24/2023               Plan:   Patient's active problems diagnostically and therapeutically optimized for the planned procedure. Patient here for VT ablation. Procedure explained in detail to patient including indications, risks, and benefits. We also discussed ablation and its indications, risks, and benefits. Complications include but are not limited to vascular injury, excessive bleeding requiring blood transfusion, groin hematoma, aortic injury " at the time of transseptal puncture, bleeding/injury to abdominal structures at time of epicardial access, cardiac tamponade requiring pericardiocentesis or open heart surgery, and thromboembolic complications or strokes. We also discussed that VT ablation can be limited by inducibility of VT at the time of EPS/Abaltion and/or the origin of VT. Efficacy of ablation also deceases if multiple foci are found. After an extensive discussion, the patient would like to pursue ablation in attempts to improve VT burden and symptoms. He states understanding and wishes to procced.     TALHA Chung CNP  Electrophysiology Consult Service  Pager: [phone]        Strong peripheral pulses

## 2025-08-10 ENCOUNTER — HEALTH MAINTENANCE LETTER (OUTPATIENT)
Age: 40
End: 2025-08-10

## (undated) DEVICE — INTRODUCER SHEATH FAST-CATH 9FRX12CM 406116

## (undated) DEVICE — CATH ANGIO INFINITI JL4 4FRX100CM 538420

## (undated) DEVICE — INTRODUCER SHEATH FAST-CATH CATH-LOCK 7FRX12CM 406702

## (undated) DEVICE — CATH EP 7FR X 115CM DECANAV CA

## (undated) DEVICE — CATH THERMOCOOL SMARTTOUCH SF DF CURVE

## (undated) DEVICE — TUBE SET SMARKABLATE IRRIGATION

## (undated) DEVICE — INTRO CATH 12CM 8.5FR FST-CATH

## (undated) DEVICE — REPRO BARD EP XT DECAPL STRBL DX EP CATH 6F

## (undated) DEVICE — MANIFOLD CUSTOM KIT FOR CTO PROCEDURE K09-11989

## (undated) DEVICE — Device

## (undated) DEVICE — INTRO SHEATH MICRO PLATINUM TIP 4FRX40CM 7274

## (undated) DEVICE — INTRO SHEATH AVANTI 4FRX23CM 504604T

## (undated) DEVICE — GUIDE WIRE NITRIX NIT 0 DEGREE .018INX60CM N180601

## (undated) DEVICE — PATCH CARTO 3 EXTERNAL REFERENCE 3D MAPPING CREFP6

## (undated) DEVICE — CATH SOUNDSTAR 8FRX90CM 10439011

## (undated) DEVICE — INTRO SHTH INTRO SHTH 40CM ST

## (undated) DEVICE — INTRO SHTH INTRO SHTH 8.5F AGI

## (undated) DEVICE — PACK HEART LEFT CUSTOM

## (undated) DEVICE — NEEDLE XS TRANSSEPTAL 18GA 98CM G407212

## (undated) DEVICE — PAD DEFIBRILLATOR ELECTRODE 4.25INX6IN ADULT 2001M-C

## (undated) DEVICE — INTRO SHEATH 4FRX10CM PINNACLE RSS402

## (undated) RX ORDER — BUPIVACAINE HYDROCHLORIDE 2.5 MG/ML
INJECTION, SOLUTION EPIDURAL; INFILTRATION; INTRACAUDAL
Status: DISPENSED
Start: 2023-04-24

## (undated) RX ORDER — CALCIUM CHLORIDE 100 MG/ML
INJECTION INTRAVENOUS; INTRAVENTRICULAR
Status: DISPENSED
Start: 2023-04-24

## (undated) RX ORDER — FENTANYL CITRATE 50 UG/ML
INJECTION, SOLUTION INTRAMUSCULAR; INTRAVENOUS
Status: DISPENSED
Start: 2023-04-24

## (undated) RX ORDER — HEPARIN SODIUM 1000 [USP'U]/ML
INJECTION, SOLUTION INTRAVENOUS; SUBCUTANEOUS
Status: DISPENSED
Start: 2023-04-24

## (undated) RX ORDER — HYDROMORPHONE HYDROCHLORIDE 1 MG/ML
INJECTION, SOLUTION INTRAMUSCULAR; INTRAVENOUS; SUBCUTANEOUS
Status: DISPENSED
Start: 2023-04-24

## (undated) RX ORDER — CEFAZOLIN SODIUM 1 G/3ML
INJECTION, POWDER, FOR SOLUTION INTRAMUSCULAR; INTRAVENOUS
Status: DISPENSED
Start: 2023-04-24

## (undated) RX ORDER — FENTANYL CITRATE-0.9 % NACL/PF 10 MCG/ML
PLASTIC BAG, INJECTION (ML) INTRAVENOUS
Status: DISPENSED
Start: 2023-04-24

## (undated) RX ORDER — LIDOCAINE HYDROCHLORIDE 10 MG/ML
INJECTION, SOLUTION EPIDURAL; INFILTRATION; INTRACAUDAL; PERINEURAL
Status: DISPENSED
Start: 2023-04-24

## (undated) RX ORDER — DOPAMINE HYDROCHLORIDE 160 MG/100ML
INJECTION, SOLUTION INTRAVENOUS
Status: DISPENSED
Start: 2023-04-24

## (undated) RX ORDER — PROPOFOL 10 MG/ML
INJECTION, EMULSION INTRAVENOUS
Status: DISPENSED
Start: 2023-04-24

## (undated) RX ORDER — ONDANSETRON 2 MG/ML
INJECTION INTRAMUSCULAR; INTRAVENOUS
Status: DISPENSED
Start: 2023-04-24

## (undated) RX ORDER — OXYCODONE AND ACETAMINOPHEN 5; 325 MG/1; MG/1
TABLET ORAL
Status: DISPENSED
Start: 2023-04-24

## (undated) RX ORDER — PROTAMINE SULFATE 10 MG/ML
INJECTION, SOLUTION INTRAVENOUS
Status: DISPENSED
Start: 2023-04-24

## (undated) RX ORDER — METHYLPREDNISOLONE SODIUM SUCCINATE 125 MG/2ML
INJECTION, POWDER, LYOPHILIZED, FOR SOLUTION INTRAMUSCULAR; INTRAVENOUS
Status: DISPENSED
Start: 2023-04-24